# Patient Record
Sex: MALE | Race: WHITE | HISPANIC OR LATINO | Employment: UNEMPLOYED | URBAN - METROPOLITAN AREA
[De-identification: names, ages, dates, MRNs, and addresses within clinical notes are randomized per-mention and may not be internally consistent; named-entity substitution may affect disease eponyms.]

---

## 2018-09-01 ENCOUNTER — APPOINTMENT (OUTPATIENT)
Dept: RADIOLOGY | Facility: MEDICAL CENTER | Age: 58
End: 2018-09-01
Attending: EMERGENCY MEDICINE
Payer: COMMERCIAL

## 2018-09-01 ENCOUNTER — HOSPITAL ENCOUNTER (OUTPATIENT)
Facility: MEDICAL CENTER | Age: 58
End: 2018-09-06
Attending: EMERGENCY MEDICINE | Admitting: INTERNAL MEDICINE
Payer: COMMERCIAL

## 2018-09-01 DIAGNOSIS — R07.9 CHEST PAIN, UNSPECIFIED TYPE: ICD-10-CM

## 2018-09-01 LAB
ALBUMIN SERPL BCP-MCNC: 4.2 G/DL (ref 3.2–4.9)
ALBUMIN/GLOB SERPL: 1.4 G/DL
ALP SERPL-CCNC: 70 U/L (ref 30–99)
ALT SERPL-CCNC: 18 U/L (ref 2–50)
ANION GAP SERPL CALC-SCNC: 8 MMOL/L (ref 0–11.9)
AST SERPL-CCNC: 18 U/L (ref 12–45)
BASOPHILS # BLD AUTO: 0.9 % (ref 0–1.8)
BASOPHILS # BLD: 0.06 K/UL (ref 0–0.12)
BILIRUB SERPL-MCNC: 0.5 MG/DL (ref 0.1–1.5)
BUN SERPL-MCNC: 23 MG/DL (ref 8–22)
CALCIUM SERPL-MCNC: 8.8 MG/DL (ref 8.5–10.5)
CHLORIDE SERPL-SCNC: 107 MMOL/L (ref 96–112)
CO2 SERPL-SCNC: 24 MMOL/L (ref 20–33)
CREAT SERPL-MCNC: 1.03 MG/DL (ref 0.5–1.4)
EKG IMPRESSION: NORMAL
EOSINOPHIL # BLD AUTO: 0.24 K/UL (ref 0–0.51)
EOSINOPHIL NFR BLD: 3.7 % (ref 0–6.9)
ERYTHROCYTE [DISTWIDTH] IN BLOOD BY AUTOMATED COUNT: 39.8 FL (ref 35.9–50)
GLOBULIN SER CALC-MCNC: 2.9 G/DL (ref 1.9–3.5)
GLUCOSE BLD-MCNC: 64 MG/DL (ref 65–99)
GLUCOSE SERPL-MCNC: 188 MG/DL (ref 65–99)
HCT VFR BLD AUTO: 40.6 % (ref 42–52)
HGB BLD-MCNC: 14.3 G/DL (ref 14–18)
IMM GRANULOCYTES # BLD AUTO: 0.08 K/UL (ref 0–0.11)
IMM GRANULOCYTES NFR BLD AUTO: 1.2 % (ref 0–0.9)
LYMPHOCYTES # BLD AUTO: 1.29 K/UL (ref 1–4.8)
LYMPHOCYTES NFR BLD: 19.7 % (ref 22–41)
MCH RBC QN AUTO: 31.6 PG (ref 27–33)
MCHC RBC AUTO-ENTMCNC: 35.2 G/DL (ref 33.7–35.3)
MCV RBC AUTO: 89.8 FL (ref 81.4–97.8)
MONOCYTES # BLD AUTO: 0.78 K/UL (ref 0–0.85)
MONOCYTES NFR BLD AUTO: 11.9 % (ref 0–13.4)
NEUTROPHILS # BLD AUTO: 4.1 K/UL (ref 1.82–7.42)
NEUTROPHILS NFR BLD: 62.6 % (ref 44–72)
NRBC # BLD AUTO: 0 K/UL
NRBC BLD-RTO: 0 /100 WBC
PLATELET # BLD AUTO: 152 K/UL (ref 164–446)
PMV BLD AUTO: 10.3 FL (ref 9–12.9)
POTASSIUM SERPL-SCNC: 3.6 MMOL/L (ref 3.6–5.5)
PROT SERPL-MCNC: 7.1 G/DL (ref 6–8.2)
RBC # BLD AUTO: 4.52 M/UL (ref 4.7–6.1)
SODIUM SERPL-SCNC: 139 MMOL/L (ref 135–145)
TROPONIN I SERPL-MCNC: <0.01 NG/ML (ref 0–0.04)
WBC # BLD AUTO: 6.6 K/UL (ref 4.8–10.8)

## 2018-09-01 PROCEDURE — 93005 ELECTROCARDIOGRAM TRACING: CPT

## 2018-09-01 PROCEDURE — 93005 ELECTROCARDIOGRAM TRACING: CPT | Performed by: EMERGENCY MEDICINE

## 2018-09-01 PROCEDURE — 84484 ASSAY OF TROPONIN QUANT: CPT

## 2018-09-01 PROCEDURE — 85025 COMPLETE CBC W/AUTO DIFF WBC: CPT

## 2018-09-01 PROCEDURE — 96374 THER/PROPH/DIAG INJ IV PUSH: CPT

## 2018-09-01 PROCEDURE — 80053 COMPREHEN METABOLIC PANEL: CPT

## 2018-09-01 PROCEDURE — 99285 EMERGENCY DEPT VISIT HI MDM: CPT

## 2018-09-01 PROCEDURE — 36415 COLL VENOUS BLD VENIPUNCTURE: CPT

## 2018-09-01 PROCEDURE — 71045 X-RAY EXAM CHEST 1 VIEW: CPT

## 2018-09-01 PROCEDURE — 82962 GLUCOSE BLOOD TEST: CPT

## 2018-09-01 PROCEDURE — 700101 HCHG RX REV CODE 250

## 2018-09-01 RX ORDER — DEXTROSE MONOHYDRATE 25 G/50ML
INJECTION, SOLUTION INTRAVENOUS
Status: COMPLETED
Start: 2018-09-01 | End: 2018-09-01

## 2018-09-01 RX ORDER — DEXTROSE MONOHYDRATE 25 G/50ML
50 INJECTION, SOLUTION INTRAVENOUS ONCE
Status: COMPLETED | OUTPATIENT
Start: 2018-09-01 | End: 2018-09-03

## 2018-09-01 RX ADMIN — DEXTROSE MONOHYDRATE 50 ML: 25 INJECTION, SOLUTION INTRAVENOUS at 22:07

## 2018-09-01 ASSESSMENT — PAIN SCALES - GENERAL: PAINLEVEL_OUTOF10: 8

## 2018-09-02 ENCOUNTER — APPOINTMENT (OUTPATIENT)
Dept: RADIOLOGY | Facility: MEDICAL CENTER | Age: 58
End: 2018-09-02
Attending: INTERNAL MEDICINE
Payer: COMMERCIAL

## 2018-09-02 PROBLEM — J43.8 OTHER EMPHYSEMA (HCC): Status: ACTIVE | Noted: 2018-09-02

## 2018-09-02 PROBLEM — I10 ESSENTIAL HYPERTENSION: Status: ACTIVE | Noted: 2018-09-02

## 2018-09-02 PROBLEM — I50.22 CHRONIC SYSTOLIC CONGESTIVE HEART FAILURE (HCC): Status: ACTIVE | Noted: 2018-09-02

## 2018-09-02 PROBLEM — R07.9 CHEST PAIN: Status: ACTIVE | Noted: 2018-09-02

## 2018-09-02 PROBLEM — E11.9 TYPE 2 DIABETES MELLITUS, WITH LONG-TERM CURRENT USE OF INSULIN (HCC): Status: ACTIVE | Noted: 2018-09-02

## 2018-09-02 PROBLEM — Z79.4 TYPE 2 DIABETES MELLITUS, WITH LONG-TERM CURRENT USE OF INSULIN (HCC): Status: ACTIVE | Noted: 2018-09-02

## 2018-09-02 PROBLEM — I25.10 CAD (CORONARY ARTERY DISEASE): Status: ACTIVE | Noted: 2018-09-02

## 2018-09-02 LAB
CHOLEST SERPL-MCNC: 109 MG/DL (ref 100–199)
EKG IMPRESSION: NORMAL
EST. AVERAGE GLUCOSE BLD GHB EST-MCNC: 243 MG/DL
GLUCOSE BLD-MCNC: 160 MG/DL (ref 65–99)
GLUCOSE BLD-MCNC: 209 MG/DL (ref 65–99)
GLUCOSE BLD-MCNC: 392 MG/DL (ref 65–99)
HBA1C MFR BLD: 10.1 % (ref 0–5.6)
HDLC SERPL-MCNC: 30 MG/DL
LDLC SERPL CALC-MCNC: 63 MG/DL
TRIGL SERPL-MCNC: 80 MG/DL (ref 0–149)
TROPONIN I SERPL-MCNC: <0.01 NG/ML (ref 0–0.04)
TSH SERPL DL<=0.005 MIU/L-ACNC: 2.49 UIU/ML (ref 0.38–5.33)

## 2018-09-02 PROCEDURE — G0378 HOSPITAL OBSERVATION PER HR: HCPCS

## 2018-09-02 PROCEDURE — 93306 TTE W/DOPPLER COMPLETE: CPT

## 2018-09-02 PROCEDURE — 96372 THER/PROPH/DIAG INJ SC/IM: CPT

## 2018-09-02 PROCEDURE — 82962 GLUCOSE BLOOD TEST: CPT | Mod: 91

## 2018-09-02 PROCEDURE — A9270 NON-COVERED ITEM OR SERVICE: HCPCS | Performed by: NURSE PRACTITIONER

## 2018-09-02 PROCEDURE — 93308 TTE F-UP OR LMTD: CPT | Mod: 26 | Performed by: INTERNAL MEDICINE

## 2018-09-02 PROCEDURE — 80061 LIPID PANEL: CPT

## 2018-09-02 PROCEDURE — A9270 NON-COVERED ITEM OR SERVICE: HCPCS | Performed by: EMERGENCY MEDICINE

## 2018-09-02 PROCEDURE — 99220 PR INITIAL OBSERVATION CARE,LEVL III: CPT | Performed by: INTERNAL MEDICINE

## 2018-09-02 PROCEDURE — 700111 HCHG RX REV CODE 636 W/ 250 OVERRIDE (IP)

## 2018-09-02 PROCEDURE — 700102 HCHG RX REV CODE 250 W/ 637 OVERRIDE(OP): Performed by: EMERGENCY MEDICINE

## 2018-09-02 PROCEDURE — A9502 TC99M TETROFOSMIN: HCPCS

## 2018-09-02 PROCEDURE — 84443 ASSAY THYROID STIM HORMONE: CPT

## 2018-09-02 PROCEDURE — 83036 HEMOGLOBIN GLYCOSYLATED A1C: CPT

## 2018-09-02 PROCEDURE — 700117 HCHG RX CONTRAST REV CODE 255: Performed by: EMERGENCY MEDICINE

## 2018-09-02 PROCEDURE — 84484 ASSAY OF TROPONIN QUANT: CPT

## 2018-09-02 PROCEDURE — 71275 CT ANGIOGRAPHY CHEST: CPT

## 2018-09-02 PROCEDURE — A9270 NON-COVERED ITEM OR SERVICE: HCPCS | Performed by: INTERNAL MEDICINE

## 2018-09-02 PROCEDURE — 93010 ELECTROCARDIOGRAM REPORT: CPT | Performed by: INTERNAL MEDICINE

## 2018-09-02 PROCEDURE — 700102 HCHG RX REV CODE 250 W/ 637 OVERRIDE(OP): Performed by: INTERNAL MEDICINE

## 2018-09-02 PROCEDURE — 93005 ELECTROCARDIOGRAM TRACING: CPT | Performed by: INTERNAL MEDICINE

## 2018-09-02 PROCEDURE — 700102 HCHG RX REV CODE 250 W/ 637 OVERRIDE(OP): Performed by: NURSE PRACTITIONER

## 2018-09-02 RX ORDER — HYDROCHLOROTHIAZIDE 25 MG/1
25 TABLET ORAL DAILY
COMMUNITY

## 2018-09-02 RX ORDER — ONDANSETRON 4 MG/1
4 TABLET, ORALLY DISINTEGRATING ORAL EVERY 4 HOURS PRN
Status: DISCONTINUED | OUTPATIENT
Start: 2018-09-02 | End: 2018-09-06 | Stop reason: HOSPADM

## 2018-09-02 RX ORDER — METOPROLOL SUCCINATE 50 MG/1
50 TABLET, EXTENDED RELEASE ORAL
Status: DISCONTINUED | OUTPATIENT
Start: 2018-09-02 | End: 2018-09-06 | Stop reason: HOSPADM

## 2018-09-02 RX ORDER — INSULIN GLARGINE 100 [IU]/ML
42 INJECTION, SOLUTION SUBCUTANEOUS EVERY MORNING
Status: ON HOLD | COMMUNITY
End: 2018-09-06

## 2018-09-02 RX ORDER — METOPROLOL SUCCINATE 50 MG/1
75 TABLET, EXTENDED RELEASE ORAL DAILY
COMMUNITY

## 2018-09-02 RX ORDER — CLOPIDOGREL BISULFATE 75 MG/1
75 TABLET ORAL DAILY
Status: DISCONTINUED | OUTPATIENT
Start: 2018-09-02 | End: 2018-09-06 | Stop reason: HOSPADM

## 2018-09-02 RX ORDER — REGADENOSON 0.08 MG/ML
INJECTION, SOLUTION INTRAVENOUS
Status: COMPLETED
Start: 2018-09-02 | End: 2018-09-02

## 2018-09-02 RX ORDER — POLYETHYLENE GLYCOL 3350 17 G/17G
1 POWDER, FOR SOLUTION ORAL
Status: DISCONTINUED | OUTPATIENT
Start: 2018-09-02 | End: 2018-09-06 | Stop reason: HOSPADM

## 2018-09-02 RX ORDER — PREGABALIN 75 MG/1
75 CAPSULE ORAL 2 TIMES DAILY
COMMUNITY

## 2018-09-02 RX ORDER — ONDANSETRON 2 MG/ML
4 INJECTION INTRAMUSCULAR; INTRAVENOUS EVERY 4 HOURS PRN
Status: DISCONTINUED | OUTPATIENT
Start: 2018-09-02 | End: 2018-09-06 | Stop reason: HOSPADM

## 2018-09-02 RX ORDER — DEXTROSE MONOHYDRATE 25 G/50ML
25 INJECTION, SOLUTION INTRAVENOUS
Status: DISCONTINUED | OUTPATIENT
Start: 2018-09-02 | End: 2018-09-04

## 2018-09-02 RX ORDER — RANITIDINE 150 MG/1
150 TABLET ORAL 2 TIMES DAILY
COMMUNITY

## 2018-09-02 RX ORDER — PREGABALIN 100 MG/1
100 CAPSULE ORAL
COMMUNITY

## 2018-09-02 RX ORDER — ENALAPRIL MALEATE 2.5 MG/1
5 TABLET ORAL
Status: DISCONTINUED | OUTPATIENT
Start: 2018-09-02 | End: 2018-09-06 | Stop reason: HOSPADM

## 2018-09-02 RX ORDER — RANOLAZINE 500 MG/1
1000 TABLET, EXTENDED RELEASE ORAL 2 TIMES DAILY
Status: DISCONTINUED | OUTPATIENT
Start: 2018-09-02 | End: 2018-09-06 | Stop reason: HOSPADM

## 2018-09-02 RX ORDER — ATORVASTATIN CALCIUM 40 MG/1
40 TABLET, FILM COATED ORAL EVERY EVENING
Status: DISCONTINUED | OUTPATIENT
Start: 2018-09-02 | End: 2018-09-06 | Stop reason: HOSPADM

## 2018-09-02 RX ORDER — ALBUTEROL SULFATE 90 UG/1
2 AEROSOL, METERED RESPIRATORY (INHALATION)
Status: DISCONTINUED | OUTPATIENT
Start: 2018-09-02 | End: 2018-09-06 | Stop reason: HOSPADM

## 2018-09-02 RX ORDER — ENALAPRIL MALEATE 5 MG/1
5 TABLET ORAL DAILY
COMMUNITY

## 2018-09-02 RX ORDER — RANOLAZINE 500 MG/1
1000 TABLET, EXTENDED RELEASE ORAL 2 TIMES DAILY
COMMUNITY

## 2018-09-02 RX ORDER — BUDESONIDE AND FORMOTEROL FUMARATE DIHYDRATE 160; 4.5 UG/1; UG/1
2 AEROSOL RESPIRATORY (INHALATION) 2 TIMES DAILY
Status: DISCONTINUED | OUTPATIENT
Start: 2018-09-02 | End: 2018-09-06 | Stop reason: HOSPADM

## 2018-09-02 RX ORDER — OMEPRAZOLE 20 MG/1
20 CAPSULE, DELAYED RELEASE ORAL DAILY
Status: DISCONTINUED | OUTPATIENT
Start: 2018-09-02 | End: 2018-09-06 | Stop reason: HOSPADM

## 2018-09-02 RX ORDER — BISACODYL 10 MG
10 SUPPOSITORY, RECTAL RECTAL
Status: DISCONTINUED | OUTPATIENT
Start: 2018-09-02 | End: 2018-09-06 | Stop reason: HOSPADM

## 2018-09-02 RX ORDER — ACETAMINOPHEN 325 MG/1
650 TABLET ORAL EVERY 6 HOURS PRN
Status: DISCONTINUED | OUTPATIENT
Start: 2018-09-02 | End: 2018-09-06 | Stop reason: HOSPADM

## 2018-09-02 RX ORDER — CLOPIDOGREL BISULFATE 75 MG/1
75 TABLET ORAL DAILY
Status: ON HOLD | COMMUNITY
End: 2018-09-06

## 2018-09-02 RX ORDER — AMOXICILLIN 250 MG
2 CAPSULE ORAL 2 TIMES DAILY
Status: DISCONTINUED | OUTPATIENT
Start: 2018-09-02 | End: 2018-09-06 | Stop reason: HOSPADM

## 2018-09-02 RX ORDER — INSULIN GLARGINE 100 [IU]/ML
42 INJECTION, SOLUTION SUBCUTANEOUS NIGHTLY
Status: ON HOLD | COMMUNITY
End: 2018-09-06

## 2018-09-02 RX ADMIN — CLOPIDOGREL 75 MG: 75 TABLET, FILM COATED ORAL at 06:50

## 2018-09-02 RX ADMIN — METOPROLOL SUCCINATE 50 MG: 50 TABLET, EXTENDED RELEASE ORAL at 06:51

## 2018-09-02 RX ADMIN — BUDESONIDE AND FORMOTEROL FUMARATE DIHYDRATE 2 PUFF: 160; 4.5 AEROSOL RESPIRATORY (INHALATION) at 17:18

## 2018-09-02 RX ADMIN — INSULIN HUMAN 2 UNITS: 100 INJECTION, SOLUTION PARENTERAL at 17:20

## 2018-09-02 RX ADMIN — NITROGLYCERIN 1 INCH: 20 OINTMENT TOPICAL at 00:25

## 2018-09-02 RX ADMIN — REGADENOSON 0.4 MG: 0.08 INJECTION, SOLUTION INTRAVENOUS at 12:00

## 2018-09-02 RX ADMIN — INSULIN HUMAN 5 UNITS: 100 INJECTION, SOLUTION PARENTERAL at 13:23

## 2018-09-02 RX ADMIN — BUDESONIDE AND FORMOTEROL FUMARATE DIHYDRATE 2 PUFF: 160; 4.5 AEROSOL RESPIRATORY (INHALATION) at 13:22

## 2018-09-02 RX ADMIN — ATORVASTATIN CALCIUM 40 MG: 40 TABLET, FILM COATED ORAL at 17:19

## 2018-09-02 RX ADMIN — ACETAMINOPHEN 650 MG: 325 TABLET, FILM COATED ORAL at 04:43

## 2018-09-02 RX ADMIN — RANOLAZINE 1000 MG: 500 TABLET, FILM COATED, EXTENDED RELEASE ORAL at 07:40

## 2018-09-02 RX ADMIN — INSULIN HUMAN 4 UNITS: 100 INJECTION, SOLUTION PARENTERAL at 21:08

## 2018-09-02 RX ADMIN — RANOLAZINE 1000 MG: 500 TABLET, FILM COATED, EXTENDED RELEASE ORAL at 17:19

## 2018-09-02 RX ADMIN — OMEPRAZOLE 20 MG: 20 CAPSULE, DELAYED RELEASE ORAL at 06:50

## 2018-09-02 RX ADMIN — IOHEXOL 100 ML: 350 INJECTION, SOLUTION INTRAVENOUS at 01:37

## 2018-09-02 RX ADMIN — ENALAPRIL MALEATE 5 MG: 5 TABLET ORAL at 06:51

## 2018-09-02 ASSESSMENT — ENCOUNTER SYMPTOMS
DIZZINESS: 0
BRUISES/BLEEDS EASILY: 0
SORE THROAT: 0
FLANK PAIN: 0
BACK PAIN: 0
NECK PAIN: 0
PALPITATIONS: 0
BLOOD IN STOOL: 0
DIAPHORESIS: 0
NAUSEA: 1
DIARRHEA: 0
FEVER: 0
SHORTNESS OF BREATH: 1
SEIZURES: 0
HEADACHES: 0
BLURRED VISION: 0
ABDOMINAL PAIN: 0
CHILLS: 0
VOMITING: 0
SPUTUM PRODUCTION: 0
WHEEZING: 0
MYALGIAS: 0
FOCAL WEAKNESS: 0
COUGH: 0

## 2018-09-02 ASSESSMENT — COPD QUESTIONNAIRES
DURING THE PAST 4 WEEKS HOW MUCH DID YOU FEEL SHORT OF BREATH: NONE/LITTLE OF THE TIME
DO YOU EVER COUGH UP ANY MUCUS OR PHLEGM?: YES, A FEW DAYS A WEEK OR MONTH
HAVE YOU SMOKED AT LEAST 100 CIGARETTES IN YOUR ENTIRE LIFE: YES
COPD SCREENING SCORE: 6

## 2018-09-02 ASSESSMENT — COGNITIVE AND FUNCTIONAL STATUS - GENERAL
MOBILITY SCORE: 24
SUGGESTED CMS G CODE MODIFIER DAILY ACTIVITY: CH
SUGGESTED CMS G CODE MODIFIER MOBILITY: CH
DAILY ACTIVITIY SCORE: 24

## 2018-09-02 ASSESSMENT — LIFESTYLE VARIABLES
ALCOHOL_USE: NO
EVER_SMOKED: YES
ALCOHOL_USE: NO

## 2018-09-02 ASSESSMENT — PATIENT HEALTH QUESTIONNAIRE - PHQ9
SUM OF ALL RESPONSES TO PHQ9 QUESTIONS 1 AND 2: 0
2. FEELING DOWN, DEPRESSED, IRRITABLE, OR HOPELESS: NOT AT ALL
1. LITTLE INTEREST OR PLEASURE IN DOING THINGS: NOT AT ALL
SUM OF ALL RESPONSES TO PHQ9 QUESTIONS 1 AND 2: 0
2. FEELING DOWN, DEPRESSED, IRRITABLE, OR HOPELESS: NOT AT ALL
1. LITTLE INTEREST OR PLEASURE IN DOING THINGS: NOT AT ALL

## 2018-09-02 ASSESSMENT — PAIN SCALES - GENERAL
PAINLEVEL_OUTOF10: 0
PAINLEVEL_OUTOF10: 2
PAINLEVEL_OUTOF10: 7

## 2018-09-02 NOTE — CONSULTS
Cardiology consultation  Reason for consultation: Abnormal stress test   asking for consultation: Dr. Lanier    HPI:    Patient is a good historian.  He is currently a prisoner.  He had open heart surgery in the distant past and most recently stenting because of chest pain in 2012.  He came into the emergency room after complaints of chest pain yesterday.    Workup including stress test showed abnormalities and he was transferred from the clinical decision unit to telemetry to see if he needed an angiogram.  The patient still endorses chest pain.  He tells me about his complex medical history.  He also recounts upon questioning that he had an angiogram less than 1 year ago in [place].  There are no valve arteries amenable to stenting and he was told medical management was key.    He is currently pain-free, says his pain can last longer be brief it is often better when he does not worry about things.  Thinks it might get worse with exertion but not actually sure.  Does not think he gets better with nitroglycerin.  He is not asking for narcotics        Past Medical History:  has a past medical history of Asthma; Congestive heart failure ([facility]); Diabetes ([facility]); Esophageal stricture; GERD (gastroesophageal reflux disease); Hypertension; and MI (myocardial infarction) ([facility]).  Past Surgical History:  has a past surgical history that includes stent placement; cardiac cath, right/left heart; and pacemaker insertion.  Past Social History:  reports that he has quit smoking. He has never used smokeless tobacco. He reports that he does not drink alcohol or use drugs.  Past Family History: History reviewed. No pertinent family history.  Allergies: Peanut (diagnostic); Carvacrol; Codeine; Isordil [isosorbide]; and Ketorolac    Current Medications:  Prior to Admission medications    Medication Sig Start Date End Date Taking? Authorizing Provider   hydroCHLOROthiazide (HYDRODIURIL) 25 MG Tab Take 25 mg by mouth every day.   Yes  "Physician Outpatient   raNITidine (ZANTAC) 150 MG Tab Take 150 mg by mouth 2 times a day.   Yes Physician Outpatient   enalapril (VASOTEC) 5 MG Tab Take 5 mg by mouth every day.   Yes Physician Outpatient   insulin glargine (LANTUS) 100 UNIT/ML Solution Inject 42 Units as instructed every evening.   Yes Physician Outpatient   insulin glargine (LANTUS) 100 UNIT/ML Solution Inject 42 Units as instructed every morning.   Yes Physician Outpatient   pregabalin (LYRICA) 75 MG Cap Take 75 mg by mouth 2 times a day.   Yes Physician Outpatient   pregabalin (LYRICA) 100 MG Cap Take 100 mg by mouth every bedtime.   Yes Physician Outpatient   metoprolol SR (TOPROL XL) 50 MG TABLET SR 24 HR Take 50 mg by mouth every day.   Yes Physician Outpatient   insulin regular (HUMULIN R) 100 Unit/mL Solution Inject  as instructed 3 times a day before meals.   Yes Physician Outpatient   ranolazine (RANEXA) 500 MG TABLET SR 12 HR Take 1,000 mg by mouth 2 times a day.   Yes Physician Outpatient   clopidogrel (PLAVIX) 75 MG Tab Take 75 mg by mouth every day.   Yes Physician Outpatient       Review of Systems:  Review of Systems   Constitutional: Negative for malaise/fatigue and weight loss.   Eyes: Negative.    Respiratory: Negative for cough, shortness of breath and wheezing.    Cardiovascular: Negative for palpitations, leg swelling and PND.   Gastrointestinal: Negative for abdominal pain, heartburn and nausea.   Musculoskeletal: Negative for joint pain and myalgias.   Neurological: Negative for dizziness and loss of consciousness.   Endo/Heme/Allergies: Does not bruise/bleed easily.   Psychiatric/Behavioral: Negative for depression. The patient is not nervous/anxious and does not have insomnia.    All other systems reviewed and are negative.    Blood pressure 110/61, pulse 60, temperature 36.7 °C (98 °F), resp. rate 16, height 1.854 m (6' 1\"), weight 86.6 kg (190 lb 14.7 oz), SpO2 93 %.    Physical Examination:  Physical Exam "   Constitutional: He is oriented to person, place, and time. He appears well-nourished.   Poor dentition but otherwise healthy looking multiple tattoos   HENT:   Head: Normocephalic and atraumatic.   Eyes: Pupils are equal, round, and reactive to light. EOM are normal. No scleral icterus.   Neck: No JVD present. No thyromegaly present.   Cardiovascular: Normal rate, regular rhythm and intact distal pulses.    No murmur heard.  Pulmonary/Chest: Breath sounds normal. He exhibits no tenderness.   Abdominal: Soft. Bowel sounds are normal. There is no tenderness.   Musculoskeletal: He exhibits no edema or tenderness.   Lymphadenopathy:     He has no cervical adenopathy.   Neurological: He is alert and oriented to person, place, and time. He exhibits normal muscle tone. Coordination normal.   Skin: Skin is warm and dry.   Psychiatric: He has a normal mood and affect. His behavior is normal.     Data:  I reviewed the images of the stress test myself.  He has 2 areas that look like small infarcts.  There is no significant tanna-infarct ischemia, and my opinion is that it adds up to less than even small.  Overall his ejection fraction is about 45% and calculated to be 44%    Troponins are negative ×3  Defibrillator is interrogated.  He said he has not put in because his ejection fraction several years ago was less than 35%.  No inappropriate function noted, no recent defibrillation or arrhythmia detected    CMP is normal with a GFR more than 60.  LDL is 63 hemoglobin A1c is 10.1    Impression:  Noncardiac chest pain  Nuclear perfusion imaging study which is reassuring near normal ejection fraction in a patient with known ischemic heart myopathy.  Infarct without significant ischemia portends good prognostic outlook at least from a coronary standpoint    Plan:    Chest pain  No indication for more intensive workup at this point.  I agree with his assessment that there is likely no culprit vessel causing his chest pain, in fact  I am not quite convinced his pain is actually coming from his heart.  His troponins being negative are very reassuring  Palliation and comfort    Coronary disease  Continue dual antiplatelet therapy  Statin  Blood pressure is excellent, continue his low-dose ACE inhibitor with his diabetes.  Diabetes control will be paramount    No further cardiac recommendations at this point, agree with omeprazole  Please do not hesitate to call if you have concerns while he is in the hospital

## 2018-09-02 NOTE — PROGRESS NOTES
Seen pt, AOx 4. On cardiac monitor with Apaced (100%), HR 60. Chest pain on sternal area and back at 8/10. Plan of care discussed includes Safety, labs, cardiac monitoring, stress test and pt understands.

## 2018-09-02 NOTE — ASSESSMENT & PLAN NOTE
Appears compensated at this time-no clinical changes noted today  Continue metoprolol and enalapril  Fluid and salt restriction

## 2018-09-02 NOTE — ASSESSMENT & PLAN NOTE
Patient's stay in the hospital was extended again today primarily due to significant persistent hyperglycemia despite increasing intensity of insulin.  Blood sugars are getting better, now in the 200-300 range.  Given patient will return to [facility] with a more liberal diet and less scrutiny regarding his blood sugars, I would like to continue to intensify while inpatient to try to keep his blood sugars as well controlled as possible once he leaves the hospital.    -Increase Humalog to 10 units scheduled q. before meals, with moderate intensity supplemental scale on top  -Continue home Lantus increased to 44 units twice daily  -hemoglobin A1c-10.1, indicating poor control  -Hypoglycemic protocol in place

## 2018-09-02 NOTE — ED TRIAGE NOTES
[facility]  Chief Complaint   Patient presents with   • Chest Pressure     mid sternal pressure that radiates to (L) jaw and arm,  8/10 at this time,  started this am after working out.       Pt on monitor,  VSS, EKG done.  No acute distress noted at this time.  ERP at bedside.    intermediate guards at bedside.

## 2018-09-02 NOTE — PROGRESS NOTES
Patient admitted at 4:30 AM this morning. Patient had a complete workup. The patient's imaging studies at this point to show an acute infarction. Patient also has a reduced left ventricular ejection fraction. The patient says he passed out in the toilet and then fell down and hit his AICD and it's ballooned up in his chest. The patient will need his AICD evaluated. Patient at this point will be transferred upstairs cardiology has been consulted because of the positive stress test and ischemia.

## 2018-09-02 NOTE — ED NOTES
Pt resting quietly,  Updated on POC/ CT scan,  Wet swab given for comfort.  guards remain at bedside.

## 2018-09-02 NOTE — ED PROVIDER NOTES
"CHIEF COMPLAINT  Chief Complaint   Patient presents with   • Chest Pressure     mid sternal pressure that radiates to (L) jaw and arm,  8/10 at this time,  started this am after working out.         HPI  Carmita-Cinthya Rosales is a 58 y.o. male who presents in transfer from an outside facility for chest pain. Patient is a complex cardiac patient with a history of 2 bypass surgeries, pacemaker implantation, and reportedly 9 stents. He notes his last cardiac catheterization with angiography was done in 2017 in [facility] and it showed only \"one vessel that is 50% open\". Patient notes the pain started this morning when he was doing pushups and is substernal radiating to his back. He says it does not feel like his previous heart attacks however he noted that he was told to go directly to the emergency room if he expressed any chest pain because he \"only had one vessel left.\"    REVIEW OF SYSTEMS  Constitutional: No fevers, weakness, weight loss   Skin: No rashes, abrasions, lacerations, or pruritus  HEENT: No diplopia or blurred vision, no eye pain, no discharge. No ear pain, ringing in ears, or decreased hearing. No sore throat, runny nose, sores, trouble swallowing, trouble speaking.  Neck: No neck pain, stiffness, or masses.  Chest: Diffuse anterior chest pain  Pulm: No shortness of breath, cough, wheezing, stridor, or pain with inspiration/expiration  Gastrointestinal: No nausea, vomiting, diarrhea, constipation, bloating, melena, hematochezia or pain.  Genitourinary: No pain, urgency, frequency, dysuria, hematuria, or polyuria.   Musculoskeletal: No recent trauma, pain, swelling, weakness  Neurologic: No sensory or motor changes. No confusion or disorientation.  Heme: No bleeding or bruising problems.   Immuno: No hx of recurrent infections      PAST MEDICAL HISTORY   has a past medical history of Asthma; Congestive heart failure (HCC); Diabetes (HCC); Esophageal stricture; GERD (gastroesophageal reflux " "disease); Hypertension; and MI (myocardial infarction) (HCC).    SOCIAL HISTORY  Social History     Social History Main Topics   • Smoking status: Former Smoker   • Smokeless tobacco: Never Used   • Alcohol use No   • Drug use: No   • Sexual activity: Not on file       SURGICAL HISTORY   has a past surgical history that includes stent placement; cardiac cath, right/left heart; and pacemaker insertion.    CURRENT MEDICATIONS  Home Medications     Reviewed by Geoffrey Nuñez R.N. (Registered Nurse) on 09/02/18 at 0734  Med List Status: Not Addressed   Medication Last Dose Status   clopidogrel (PLAVIX) 75 MG Tab  Active   enalapril (VASOTEC) 5 MG Tab  Active   hydroCHLOROthiazide (HYDRODIURIL) 25 MG Tab  Active   insulin glargine (LANTUS) 100 UNIT/ML Solution  Active   insulin glargine (LANTUS) 100 UNIT/ML Solution  Active   insulin regular (HUMULIN R) 100 Unit/mL Solution  Active   metoprolol SR (TOPROL XL) 50 MG TABLET SR 24 HR  Active   pregabalin (LYRICA) 100 MG Cap  Active   pregabalin (LYRICA) 75 MG Cap  Active   raNITidine (ZANTAC) 150 MG Tab  Active   ranolazine (RANEXA) 500 MG TABLET SR 12 HR  Active                ALLERGIES  Allergies   Allergen Reactions   • Peanut (Diagnostic) Anaphylaxis   • Carvacrol Shortness of Breath   • Codeine Rash         • Isordil [Isosorbide] Shortness of Breath   • Ketorolac Unspecified      \"doesn't remember\"       PHYSICAL EXAM  VITAL SIGNS: /61   Pulse 60   Temp 36.7 °C (98 °F)   Resp 16   Ht 1.854 m (6' 1\")   Wt 86.6 kg (190 lb 14.7 oz) Comment: pt had handcuffs  SpO2 93%   BMI 25.19 kg/m²    Gen: Alert in no apparent distress.  HEENT: No signs of trauma, Bilateral external ears normal, Nose normal. Conjunctiva normal, Non-icteric.   Neck:  No tenderness, Supple, No masses  Lymphatic: No cervical lymphadenopathy noted.   Cardiovascular: Regular rate and rhythm, no murmurs.   Thorax & Lungs: Normal breath sounds, No respiratory distress, No wheezing bilateral " chest rise  Abdomen: Bowel sounds normal, Soft, No tenderness, No masses, No pulsatile masses. No Guarding or rebound  Skin: Warm, Dry, No erythema, No rash.   Back: No bony tenderness, No CVA tenderness.   Extremities: Intact distal pulses, No edema, No tenderness, range of motion grossly normal all ext. No tenderness to palpation or major deformities noted.   Neurologic: Alert , no facial droop, grossly normal coordination and strength  Psychiatric: Affect normal, Judgment normal, Mood normal.       INITIAL IMPRESSION  Patient arrives in transfer from an outside facility for possible acute coronary syndrome. Patient appears nontoxic and nondistressed on initial evaluation and had reassuring initial labs at the outside facility. Given his extremely complex history, the patient will be admitted to the hospitalist service for further treatment and evaluation. I will obtain another set of enzymes and likely will need to perform an angiographic analysis of his pulmonary arteries due to the possibility of PE being the culprit.    LABS  Results for orders placed or performed during the hospital encounter of 09/01/18   CBC WITH DIFFERENTIAL   Result Value Ref Range    WBC 6.6 4.8 - 10.8 K/uL    RBC 4.52 (L) 4.70 - 6.10 M/uL    Hemoglobin 14.3 14.0 - 18.0 g/dL    Hematocrit 40.6 (L) 42.0 - 52.0 %    MCV 89.8 81.4 - 97.8 fL    MCH 31.6 27.0 - 33.0 pg    MCHC 35.2 33.7 - 35.3 g/dL    RDW 39.8 35.9 - 50.0 fL    Platelet Count 152 (L) 164 - 446 K/uL    MPV 10.3 9.0 - 12.9 fL    Neutrophils-Polys 62.60 44.00 - 72.00 %    Lymphocytes 19.70 (L) 22.00 - 41.00 %    Monocytes 11.90 0.00 - 13.40 %    Eosinophils 3.70 0.00 - 6.90 %    Basophils 0.90 0.00 - 1.80 %    Immature Granulocytes 1.20 (H) 0.00 - 0.90 %    Nucleated RBC 0.00 /100 WBC    Neutrophils (Absolute) 4.10 1.82 - 7.42 K/uL    Lymphs (Absolute) 1.29 1.00 - 4.80 K/uL    Monos (Absolute) 0.78 0.00 - 0.85 K/uL    Eos (Absolute) 0.24 0.00 - 0.51 K/uL    Baso (Absolute) 0.06  0.00 - 0.12 K/uL    Immature Granulocytes (abs) 0.08 0.00 - 0.11 K/uL    NRBC (Absolute) 0.00 K/uL   COMP METABOLIC PANEL   Result Value Ref Range    Sodium 139 135 - 145 mmol/L    Potassium 3.6 3.6 - 5.5 mmol/L    Chloride 107 96 - 112 mmol/L    Co2 24 20 - 33 mmol/L    Anion Gap 8.0 0.0 - 11.9    Glucose 188 (H) 65 - 99 mg/dL    Bun 23 (H) 8 - 22 mg/dL    Creatinine 1.03 0.50 - 1.40 mg/dL    Calcium 8.8 8.5 - 10.5 mg/dL    AST(SGOT) 18 12 - 45 U/L    ALT(SGPT) 18 2 - 50 U/L    Alkaline Phosphatase 70 30 - 99 U/L    Total Bilirubin 0.5 0.1 - 1.5 mg/dL    Albumin 4.2 3.2 - 4.9 g/dL    Total Protein 7.1 6.0 - 8.2 g/dL    Globulin 2.9 1.9 - 3.5 g/dL    A-G Ratio 1.4 g/dL   TROPONIN   Result Value Ref Range    Troponin I <0.01 0.00 - 0.04 ng/mL   ESTIMATED GFR   Result Value Ref Range    GFR If African American >60 >60 mL/min/1.73 m 2    GFR If Non African American >60 >60 mL/min/1.73 m 2   Troponin in four (4) hours   Result Value Ref Range    Troponin I <0.01 0.00 - 0.04 ng/mL   TSH (Thyroid Stimulating Hormone)   Result Value Ref Range    TSH 2.490 0.380 - 5.330 uIU/mL   Lipid Profile (Lipid Panel) Fasting   Result Value Ref Range    Cholesterol,Tot 109 100 - 199 mg/dL    Triglycerides 80 0 - 149 mg/dL    HDL 30 (A) >=40 mg/dL    LDL 63 <100 mg/dL   Hemoglobin A1c   Result Value Ref Range    Glycohemoglobin 10.1 (H) 0.0 - 5.6 %    Est Avg Glucose 243 mg/dL   ACCU-CHEK GLUCOSE   Result Value Ref Range    Glucose - Accu-Ck 64 (L) 65 - 99 mg/dL   ACCU-CHEK GLUCOSE   Result Value Ref Range    Glucose - Accu-Ck 160 (H) 65 - 99 mg/dL   ACCU-CHEK GLUCOSE   Result Value Ref Range    Glucose - Accu-Ck 392 (H) 65 - 99 mg/dL   EKG (NOW)   Result Value Ref Range    Report       [facility] Emergency Dept[facility]    Test Date:  2018-09-01  Pt Name:    [facility]         Department: ER  MRN:        [id number]                      Room:       RD 03  Gender:     Male                         Technician:  [id number]  :        1960                   Requested By:ER TRIAGE PROTOCOL  Order #:    [id number]                    Reading MD:    Measurements  Intervals                                Axis  Rate:       60                           P:  TX:         152                          QRS:        8  QRSD:       138                          T:          168  QT:         428  QTc:        428    Interpretive Statements  ATRIAL-PACED RHYTHM  NONSPECIFIC INTRAVENTRICULAR CONDUCTION DELAY  INFERIOR INFARCT, AGE INDETERMINATE  PROBABLE LATERAL INFARCT, AGE INDETERMINATE  No previous ECG available for comparison     EKG in four (4) hours   Result Value Ref Range    Report       [facility]    [facility] Date:  2018  [facility]         [facility]  MRN:        [id number]                      Room:       T216  Gender:     Male                         Technician: LOUISA  :        1960                   Requested By:SHANNEN PANDYA  Order #:    [id number]                    Reading MD: Caleb Light MD    Measurements  Intervals                                Axis  Rate:       60                           P:  TX:         156                          QRS:        23  QRSD:       136                          T:          218  QT:         460  QTc:        460    Interpretive Statements  ATRIAL-PACED RHYTHM  NONSPECIFIC INTRAVENTRICULAR CONDUCTION DELAY  INFERIOR LATERAL INFARCTION, AGE INDETERMINATE    Electronically Signed On 2018 7:44:53 PDT by Caleb Light MD         RADIOLOGY  NM-CARDIAC STRESS TEST   Final Result      CT-CTA CHEST PULMONARY ARTERY W/ RECONS   Final Result      No central or segmental pulmonary embolus is identified.      Dependent airspace opacities likely represent atelectasis. Slightly more confluent patchy opacity in the superior segment of the left lower lobe may be infectious/inflammatory. Follow-up is recommended.      Cardiomegaly.      Healing fractures of the  anterior left third, fourth and fifth ribs.      Underlying emphysematous changes.      Small hiatal hernia.      1.2 cm nodule in the left upper quadrant may represent a splenule or an enlarged lymph node.      Duodenal diverticulum is likely present.                  DX-CHEST-PORTABLE (1 VIEW)   Final Result      No acute cardiopulmonary process is seen.      Mild cardiomegaly.         ECHOCARDIOGRAM COMP W/O CONT    (Results Pending)       Reevaluation   Time: 11 PM  Vital signs: Stable per nursing note  Assessment: No change    Reevaluation   Time: 1:30 AM, September 2  Vital signs: Reviewed per nursing note, appears stable   Assessment: No change    Reevaluation   Time: 3 AM, September 2  Vital signs: Reviewed per nursing note, appears stable  Assessment: No change        COURSE & MEDICAL DECISION MAKING  Pertinent Labs & Imaging studies reviewed. (See chart for details)  Patient arrived to the emergency department in transfer from an outside facility for cardiology consultation due to his complex medical history and ongoing chest pain. He had no findings to suggest pulmonary embolism or acute cardiac necrosis and remained hemodynamically grossly neurologically stable while in the emergency department. He will be admitted to the hospitalist service for further treatment and evaluation and likely will receive provocative testing this morning.    FINAL IMPRESSION  1. Chest pain, unspecified type        Electronically signed by: Joel Davies, 9/1/2018 9:58 PM

## 2018-09-02 NOTE — ASSESSMENT & PLAN NOTE
-no clear e/o ACS  Initial EKG and troponin negative  -Nuc med cardiac perfusion testing reviewed with cardiology, no evidence of active reversible ischemia.  -continue outpatient ranexa, add nitro PRN, will consider imdur if need for further antianginal  Nitro and morphine when necessary for chest pain    9/5: Patient denies any further chest discomfort today, overall reports he feels well.

## 2018-09-02 NOTE — H&P
"Hospital Medicine History & Physical Note    Date of Service  9/2/2018    Primary Care Physician  No primary care provider on file.    Consultants  None    Code Status  Full code    Chief Complaint  Chest pain    History of Presenting Illness  58 y.o. male with a past medical history of coronary artery disease status post CABG and multiple stent placement, congestive heart failure with systolic dysfunction, emphysema, insulin-dependent diabetes mellitus, esophageal stricture, GERD, hypertension, chronic back pain who presented 9/1/2018 with chest pain that started yesterday night.  Patient reports left-sided crushing chest pain rated at 8/10 intensity with radiation to the left arm, jaw and back with associated shortness of breath and nausea.  He denies any relieving or exacerbating factors.  Patient received full dose of aspirin prior to arrival.  He denies any fevers, cough, headache, lightheadedness, diaphoresis or abdominal pain.  He states he underwent an angiography in 2017 that showed \"only one vessel was left and was 50% occluded\".    Initial troponin less than 0.01  EKG interpreted by me reveals atrial paced rhythm with Q waves inferior and lateral leads.  No ST elevation noted  Chest x-ray revealed cardiomegaly with no pulmonary edema or focal pulmonary consolidations    Review of Systems  Review of Systems   Constitutional: Negative for chills, diaphoresis and fever.   HENT: Negative for hearing loss and sore throat.    Eyes: Negative for blurred vision.   Respiratory: Positive for shortness of breath. Negative for cough, sputum production and wheezing.    Cardiovascular: Positive for chest pain. Negative for palpitations and leg swelling.   Gastrointestinal: Positive for nausea. Negative for abdominal pain, blood in stool, diarrhea and vomiting.   Genitourinary: Negative for dysuria, flank pain and urgency.   Musculoskeletal: Negative for back pain, joint pain, myalgias and neck pain.   Skin: Negative " "for rash.   Neurological: Negative for dizziness, focal weakness, seizures and headaches.   Endo/Heme/Allergies: Does not bruise/bleed easily.   Psychiatric/Behavioral: Negative for suicidal ideas.   All other systems reviewed and are negative.      Past Medical History   has a past medical history of Asthma; Congestive heart failure (HCC); Diabetes (HCC); Esophageal stricture; GERD (gastroesophageal reflux disease); Hypertension; and MI (myocardial infarction) ([facility]).    Surgical History   has a past surgical history that includes stent placement; cardiac cath, right/left heart; and pacemaker insertion.     Family History  History reviewed.  No pertinent family history    Social History   reports that he has quit smoking. He has never used smokeless tobacco. He reports that he does not drink alcohol or use drugs.    Allergies  Allergies   Allergen Reactions   • Peanut (Diagnostic) Anaphylaxis   • Carvacrol Shortness of Breath   • Codeine Rash         • Isordil [Isosorbide] Shortness of Breath   • Ketorolac Unspecified      \"doesn't remember\"       Medications  Toprol 50 mg daily  Enalapril 5 mg daily  Plavix 75 mg daily  Atorvastatin 40 mg daily  Ranexa thousand milligrams twice daily  Aspirin 81 mg daily  Prevacid 20 mg daily  Dulera  Albuterol  Insulin sliding scale       Physical Exam  Blood Pressure: 155/75   Temperature: 37 °C (98.6 °F)   Pulse: (!) 59   Respiration: 13   Pulse Oximetry: 94 %     Physical Exam   Constitutional: He is oriented to person, place, and time. He appears well-developed and well-nourished. No distress.   HENT:   Head: Normocephalic and atraumatic.   Mouth/Throat: Oropharynx is clear and moist.   Eyes: Pupils are equal, round, and reactive to light. Conjunctivae are normal. No scleral icterus.   Neck: Normal range of motion. Neck supple.   Cardiovascular: Normal rate, regular rhythm and normal heart sounds.    Pulmonary/Chest: Effort normal and breath sounds normal. No respiratory " distress. He has no wheezes. He has no rales.   Abdominal: Soft. Bowel sounds are normal. He exhibits no distension. There is no tenderness. There is no rebound.   Musculoskeletal: Normal range of motion. He exhibits no edema or tenderness.   Lymphadenopathy:     He has no cervical adenopathy.   Neurological: He is alert and oriented to person, place, and time. No cranial nerve deficit. Coordination normal.   Skin: Skin is warm. No rash noted.   Psychiatric: He has a normal mood and affect. His behavior is normal.   Nursing note and vitals reviewed.      Laboratory:  Recent Labs      09/01/18   2230   WBC  6.6   RBC  4.52*   HEMOGLOBIN  14.3   HEMATOCRIT  40.6*   MCV  89.8   MCH  31.6   MCHC  35.2   RDW  39.8   PLATELETCT  152*   MPV  10.3     Recent Labs      09/01/18 2230   SODIUM  139   POTASSIUM  3.6   CHLORIDE  107   CO2  24   GLUCOSE  188*   BUN  23*   CREATININE  1.03   CALCIUM  8.8     Recent Labs      09/01/18   2230   ALTSGPT  18   ASTSGOT  18   ALKPHOSPHAT  70   TBILIRUBIN  0.5   GLUCOSE  188*                 Lab Results   Component Value Date    TROPONINI <0.01 09/01/2018       Urinalysis:    No results found     Imaging:  CT-CTA CHEST PULMONARY ARTERY W/ RECONS   Final Result      No central or segmental pulmonary embolus is identified.      Dependent airspace opacities likely represent atelectasis. Slightly more confluent patchy opacity in the superior segment of the left lower lobe may be infectious/inflammatory. Follow-up is recommended.      Cardiomegaly.      Healing fractures of the anterior left third, fourth and fifth ribs.      Underlying emphysematous changes.      Small hiatal hernia.      1.2 cm nodule in the left upper quadrant may represent a splenule or an enlarged lymph node.      Duodenal diverticulum is likely present.                  DX-CHEST-PORTABLE (1 VIEW)   Final Result      No acute cardiopulmonary process is seen.      Mild cardiomegaly.         NM-CARDIAC STRESS TEST     (Results Pending)         Assessment/Plan:  I anticipate this patient is appropriate for observation status at this time.    Chest pain- (present on admission)   Assessment & Plan    Rule out ACS  Initial EKG and troponin negative  Continuous cardiac monitoring with serial EKG and troponin  Nuclear medicine cardiac stress test in the morning if troponin remains negative  Patient has been given full dose of aspirin  Check lipid panel, TSH and hemoglobin A1c  Nitro and morphine when necessary for chest pain          Type 2 diabetes mellitus, with long-term current use of insulin (HCC)   Assessment & Plan    Start on insulin sliding scale with serial Accu-Checks  Check hemoglobin A1c  Hypoglycemic protocol in place            Other emphysema (HCC)- (present on admission)   Assessment & Plan    Continue Dulera and albuterol as needed        Essential hypertension- (present on admission)   Assessment & Plan    Continue metoprolol and enalapril        Chronic systolic congestive heart failure (HCC)- (present on admission)   Assessment & Plan    Appears compensated at this time  Continue metoprolol and enalapril  Fluid and salt restriction        CAD (coronary artery disease)- (present on admission)   Assessment & Plan    Status post CABG and multiple stent placement  Continue aspirin, Plavix and atorvastatin            VTE prophylaxis: SCD

## 2018-09-03 LAB
ANION GAP SERPL CALC-SCNC: 6 MMOL/L (ref 0–11.9)
BASOPHILS # BLD AUTO: 0.6 % (ref 0–1.8)
BASOPHILS # BLD: 0.04 K/UL (ref 0–0.12)
BUN SERPL-MCNC: 20 MG/DL (ref 8–22)
CALCIUM SERPL-MCNC: 9.2 MG/DL (ref 8.5–10.5)
CHLORIDE SERPL-SCNC: 103 MMOL/L (ref 96–112)
CO2 SERPL-SCNC: 21 MMOL/L (ref 20–33)
CREAT SERPL-MCNC: 0.92 MG/DL (ref 0.5–1.4)
EOSINOPHIL # BLD AUTO: 0.18 K/UL (ref 0–0.51)
EOSINOPHIL NFR BLD: 2.9 % (ref 0–6.9)
ERYTHROCYTE [DISTWIDTH] IN BLOOD BY AUTOMATED COUNT: 39.7 FL (ref 35.9–50)
GLUCOSE BLD-MCNC: 262 MG/DL (ref 65–99)
GLUCOSE BLD-MCNC: 320 MG/DL (ref 65–99)
GLUCOSE BLD-MCNC: 395 MG/DL (ref 65–99)
GLUCOSE BLD-MCNC: 445 MG/DL (ref 65–99)
GLUCOSE BLD-MCNC: 454 MG/DL (ref 65–99)
GLUCOSE SERPL-MCNC: 340 MG/DL (ref 65–99)
HCT VFR BLD AUTO: 41.4 % (ref 42–52)
HGB BLD-MCNC: 14 G/DL (ref 14–18)
IMM GRANULOCYTES # BLD AUTO: 0.04 K/UL (ref 0–0.11)
IMM GRANULOCYTES NFR BLD AUTO: 0.6 % (ref 0–0.9)
LV EJECT FRACT  99904: 40
LV EJECT FRACT MOD 2C 99903: 47.46
LV EJECT FRACT MOD 4C 99902: 42.61
LV EJECT FRACT MOD BP 99901: 44.4
LYMPHOCYTES # BLD AUTO: 0.85 K/UL (ref 1–4.8)
LYMPHOCYTES NFR BLD: 13.5 % (ref 22–41)
MAGNESIUM SERPL-MCNC: 1.9 MG/DL (ref 1.5–2.5)
MCH RBC QN AUTO: 30.6 PG (ref 27–33)
MCHC RBC AUTO-ENTMCNC: 33.8 G/DL (ref 33.7–35.3)
MCV RBC AUTO: 90.6 FL (ref 81.4–97.8)
MONOCYTES # BLD AUTO: 0.66 K/UL (ref 0–0.85)
MONOCYTES NFR BLD AUTO: 10.5 % (ref 0–13.4)
NEUTROPHILS # BLD AUTO: 4.52 K/UL (ref 1.82–7.42)
NEUTROPHILS NFR BLD: 71.9 % (ref 44–72)
NRBC # BLD AUTO: 0 K/UL
NRBC BLD-RTO: 0 /100 WBC
PLATELET # BLD AUTO: 142 K/UL (ref 164–446)
PMV BLD AUTO: 10.3 FL (ref 9–12.9)
POTASSIUM SERPL-SCNC: 4.1 MMOL/L (ref 3.6–5.5)
RBC # BLD AUTO: 4.57 M/UL (ref 4.7–6.1)
SODIUM SERPL-SCNC: 130 MMOL/L (ref 135–145)
WBC # BLD AUTO: 6.3 K/UL (ref 4.8–10.8)

## 2018-09-03 PROCEDURE — G0378 HOSPITAL OBSERVATION PER HR: HCPCS

## 2018-09-03 PROCEDURE — 700102 HCHG RX REV CODE 250 W/ 637 OVERRIDE(OP): Performed by: INTERNAL MEDICINE

## 2018-09-03 PROCEDURE — 36415 COLL VENOUS BLD VENIPUNCTURE: CPT

## 2018-09-03 PROCEDURE — 85025 COMPLETE CBC W/AUTO DIFF WBC: CPT

## 2018-09-03 PROCEDURE — 82962 GLUCOSE BLOOD TEST: CPT | Mod: 91

## 2018-09-03 PROCEDURE — 99225 PR SUBSEQUENT OBSERVATION CARE,LEVEL II: CPT | Performed by: INTERNAL MEDICINE

## 2018-09-03 PROCEDURE — A9270 NON-COVERED ITEM OR SERVICE: HCPCS | Performed by: SPECIALIST

## 2018-09-03 PROCEDURE — 80048 BASIC METABOLIC PNL TOTAL CA: CPT

## 2018-09-03 PROCEDURE — A9270 NON-COVERED ITEM OR SERVICE: HCPCS | Performed by: INTERNAL MEDICINE

## 2018-09-03 PROCEDURE — 96372 THER/PROPH/DIAG INJ SC/IM: CPT

## 2018-09-03 PROCEDURE — 700102 HCHG RX REV CODE 250 W/ 637 OVERRIDE(OP): Performed by: SPECIALIST

## 2018-09-03 PROCEDURE — 83735 ASSAY OF MAGNESIUM: CPT

## 2018-09-03 RX ORDER — NITROGLYCERIN 0.4 MG/1
0.4 TABLET SUBLINGUAL
Status: DISCONTINUED | OUTPATIENT
Start: 2018-09-03 | End: 2018-09-06 | Stop reason: HOSPADM

## 2018-09-03 RX ORDER — INSULIN GLARGINE 100 [IU]/ML
42 INJECTION, SOLUTION SUBCUTANEOUS EVERY EVENING
Status: DISCONTINUED | OUTPATIENT
Start: 2018-09-03 | End: 2018-09-05

## 2018-09-03 RX ORDER — INSULIN GLARGINE 100 [IU]/ML
42 INJECTION, SOLUTION SUBCUTANEOUS EVERY MORNING
Status: DISCONTINUED | OUTPATIENT
Start: 2018-09-04 | End: 2018-09-05

## 2018-09-03 RX ORDER — PREGABALIN 75 MG/1
75 CAPSULE ORAL 2 TIMES DAILY
Status: DISCONTINUED | OUTPATIENT
Start: 2018-09-04 | End: 2018-09-06 | Stop reason: HOSPADM

## 2018-09-03 RX ORDER — INSULIN GLARGINE 100 [IU]/ML
10 INJECTION, SOLUTION SUBCUTANEOUS
Status: DISCONTINUED | OUTPATIENT
Start: 2018-09-03 | End: 2018-09-04

## 2018-09-03 RX ORDER — PREGABALIN 100 MG/1
100 CAPSULE ORAL NIGHTLY
Status: DISCONTINUED | OUTPATIENT
Start: 2018-09-03 | End: 2018-09-06 | Stop reason: HOSPADM

## 2018-09-03 RX ADMIN — CLOPIDOGREL 75 MG: 75 TABLET, FILM COATED ORAL at 06:39

## 2018-09-03 RX ADMIN — INSULIN HUMAN 6 UNITS: 100 INJECTION, SOLUTION PARENTERAL at 18:15

## 2018-09-03 RX ADMIN — PREGABALIN 100 MG: 100 CAPSULE ORAL at 23:08

## 2018-09-03 RX ADMIN — ASPIRIN 81 MG: 81 TABLET, DELAYED RELEASE ORAL at 06:39

## 2018-09-03 RX ADMIN — ENALAPRIL MALEATE 5 MG: 5 TABLET ORAL at 06:39

## 2018-09-03 RX ADMIN — ATORVASTATIN CALCIUM 40 MG: 40 TABLET, FILM COATED ORAL at 18:12

## 2018-09-03 RX ADMIN — INSULIN HUMAN 6 UNITS: 100 INJECTION, SOLUTION PARENTERAL at 20:28

## 2018-09-03 RX ADMIN — BUDESONIDE AND FORMOTEROL FUMARATE DIHYDRATE 2 PUFF: 160; 4.5 AEROSOL RESPIRATORY (INHALATION) at 18:12

## 2018-09-03 RX ADMIN — RANOLAZINE 1000 MG: 500 TABLET, FILM COATED, EXTENDED RELEASE ORAL at 18:12

## 2018-09-03 RX ADMIN — OMEPRAZOLE 20 MG: 20 CAPSULE, DELAYED RELEASE ORAL at 06:39

## 2018-09-03 RX ADMIN — BUDESONIDE AND FORMOTEROL FUMARATE DIHYDRATE 2 PUFF: 160; 4.5 AEROSOL RESPIRATORY (INHALATION) at 06:40

## 2018-09-03 RX ADMIN — NITROGLYCERIN 0.4 MG: 0.4 TABLET SUBLINGUAL at 11:27

## 2018-09-03 RX ADMIN — RANOLAZINE 1000 MG: 500 TABLET, FILM COATED, EXTENDED RELEASE ORAL at 06:39

## 2018-09-03 RX ADMIN — INSULIN HUMAN 5 UNITS: 100 INJECTION, SOLUTION PARENTERAL at 11:31

## 2018-09-03 RX ADMIN — INSULIN GLARGINE 10 UNITS: 100 INJECTION, SOLUTION SUBCUTANEOUS at 11:31

## 2018-09-03 RX ADMIN — METOPROLOL SUCCINATE 50 MG: 50 TABLET, EXTENDED RELEASE ORAL at 06:39

## 2018-09-03 RX ADMIN — INSULIN HUMAN 3 UNITS: 100 INJECTION, SOLUTION PARENTERAL at 06:32

## 2018-09-03 ASSESSMENT — PAIN SCALES - GENERAL
PAINLEVEL_OUTOF10: 4
PAINLEVEL_OUTOF10: 0
PAINLEVEL_OUTOF10: 4

## 2018-09-03 ASSESSMENT — ENCOUNTER SYMPTOMS
PALPITATIONS: 0
FOCAL WEAKNESS: 0
SHORTNESS OF BREATH: 0
ABDOMINAL PAIN: 0
FEVER: 0
DIZZINESS: 0
VOMITING: 0
NAUSEA: 0
MYALGIAS: 0
CHILLS: 0
LOSS OF CONSCIOUSNESS: 0
BRUISES/BLEEDS EASILY: 0
NECK PAIN: 0
DEPRESSION: 0
BLURRED VISION: 0

## 2018-09-03 NOTE — PROGRESS NOTES
[facility] Medicine Daily Progress Note    Date of Service  9/3/2018    Chief Complaint  58 y.o. male admitted 9/1/2018 with chest pain    Hospital Course  See below    Interval Problem Update  CP-mild today, about 4/10 localized over the L pectoral region, no associated nausea, TELE showed 100% paced with no ectopy.    Consultants/Specialty  CARDS    Disposition  TELE    Review of Systems  Review of Systems   Constitutional: Negative for chills and fever.   HENT: Negative for hearing loss.    Eyes: Negative for blurred vision.   Respiratory: Negative for shortness of breath.    Cardiovascular: Positive for chest pain. Negative for palpitations and leg swelling.   Gastrointestinal: Negative for abdominal pain, nausea and vomiting.   Genitourinary: Negative for dysuria and urgency.   Musculoskeletal: Negative for myalgias and neck pain.   Neurological: Negative for dizziness, focal weakness and loss of consciousness.   Endo/Heme/Allergies: Does not bruise/bleed easily.   Psychiatric/Behavioral: Negative for depression.   All other systems reviewed and are negative.       Physical Exam  Blood Pressure: 106/67   Temperature: 36.8 °C (98.2 °F)   Pulse: 60   Respiration: 14   Pulse Oximetry: 93 %     Physical Exam   Constitutional: He is oriented to person, place, and time. He appears well-developed and well-nourished. No distress.   HENT:   Head: Normocephalic and atraumatic.   Mouth/Throat: No oropharyngeal exudate.   Eyes: Pupils are equal, round, and reactive to light. Right eye exhibits no discharge. Left eye exhibits no discharge.   Neck: Normal range of motion. Neck supple.   Cardiovascular: Normal rate, regular rhythm, normal heart sounds and intact distal pulses.    No murmur heard.  Pulmonary/Chest: Effort normal and breath sounds normal. No stridor. No respiratory distress. He has no wheezes. He has no rales.   Abdominal: Soft. Bowel sounds are normal. There is no tenderness.   Musculoskeletal: Normal range  of motion. He exhibits no edema or tenderness.   Neurological: He is alert and oriented to person, place, and time. No cranial nerve deficit.   Skin: Skin is warm and dry. No rash noted.   Psychiatric: He has a normal mood and affect.   Nursing note and vitals reviewed.      Fluids    Intake/Output Summary (Last 24 hours) at 09/03/18 1642  Last data filed at 09/03/18 1400   Gross per 24 hour   Intake              600 ml   Output             2650 ml   Net            -2050 ml       Laboratory  Recent Labs      09/01/18 2230 09/03/18   0056   WBC  6.6  6.3   RBC  4.52*  4.57*   HEMOGLOBIN  14.3  14.0   HEMATOCRIT  40.6*  41.4*   MCV  89.8  90.6   MCH  31.6  30.6   MCHC  35.2  33.8   RDW  39.8  39.7   PLATELETCT  152*  142*   MPV  10.3  10.3     Recent Labs      09/01/18 2230 09/03/18   0056   SODIUM  139  130*   POTASSIUM  3.6  4.1   CHLORIDE  107  103   CO2  24  21   GLUCOSE  188*  340*   BUN  23*  20   CREATININE  1.03  0.92   CALCIUM  8.8  9.2             Recent Labs      09/02/18   0450   TRIGLYCERIDE  80   HDL  30*   LDL  63       Imaging  ECHOCARDIOGRAM COMP W/O CONT   Final Result      NM-CARDIAC STRESS TEST   Final Result      CT-CTA CHEST PULMONARY ARTERY W/ RECONS   Final Result      No central or segmental pulmonary embolus is identified.      Dependent airspace opacities likely represent atelectasis. Slightly more confluent patchy opacity in the superior segment of the left lower lobe may be infectious/inflammatory. Follow-up is recommended.      Cardiomegaly.      Healing fractures of the anterior left third, fourth and fifth ribs.      Underlying emphysematous changes.      Small hiatal hernia.      1.2 cm nodule in the left upper quadrant may represent a splenule or an enlarged lymph node.      Duodenal diverticulum is likely present.                  DX-CHEST-PORTABLE (1 VIEW)   Final Result      No acute cardiopulmonary process is seen.      Mild cardiomegaly.              Assessment/Plan  Chest  pain- (present on admission)   Assessment & Plan    -no clear e/o ACS  Initial EKG and troponin negative  Continuous cardiac monitoring with serial EKG and troponin  -NM stress test positive, but cards consulted and recommended pain control at [facility]  -continue outpatient ranexa, add nitro PRN, consider imdur  -need to control sugars better for his DM  Nitro and morphine when necessary for chest pain          Type 2 diabetes mellitus, with long-term current use of insulin (HCC)- (present on admission)   Assessment & Plan    -continue on insulin sliding scale with serial Accu-Checks, adjust PRN  Check hemoglobin A1c-10.1, indicating poor control  Hypoglycemic protocol in place            Other emphysema (HCC)- (present on admission)   Assessment & Plan    Continue Dulera and albuterol as needed        Essential hypertension- (present on admission)   Assessment & Plan    Continue metoprolol and enalapril        Chronic systolic congestive heart failure (HCC)- (present on admission)   Assessment & Plan    Appears compensated at this time-no clinical changes noted today  Continue metoprolol and enalapril  Fluid and salt restriction        CAD (coronary artery disease)- (present on admission)   Assessment & Plan    Status post CABG and multiple stent placement  Continue aspirin, Plavix and atorvastatin

## 2018-09-03 NOTE — PROGRESS NOTES
"Report received. Pt care assumed. Assessment performed. Pt AOx4. Pt laying supine in bed. Pt c/o 5/10 cp \"same as before\" and no signs of distress. Refusing tylenol at this time. MD notified by vaishali RN. Guards at bedside. Bed in low, locked position. Bed alarm on. Call light within reach. Treaded socks on pt.  Hourly rounding in place.  "

## 2018-09-03 NOTE — PROGRESS NOTES
Pt complaining of 5/10 chest pain. Pt states same as admission pain. Pt refuses tylenol. Updated Dr. Thapa, no new orders.

## 2018-09-03 NOTE — PROGRESS NOTES
Assumed care of pt. He is alert and oriented and denies pain. Discussed POC, pt AICD evaluated earlier today. Discussed safety. Pt is currently hand cuffed to the bed. Educated about not attempting to get out of bed without calling. Non slip socks on. Call light and personal belongings are within reach.    2 guards at bedside.

## 2018-09-04 LAB
GLUCOSE BLD-MCNC: 286 MG/DL (ref 65–99)
GLUCOSE BLD-MCNC: 333 MG/DL (ref 65–99)
GLUCOSE BLD-MCNC: 362 MG/DL (ref 65–99)
GLUCOSE BLD-MCNC: 369 MG/DL (ref 65–99)

## 2018-09-04 PROCEDURE — A9270 NON-COVERED ITEM OR SERVICE: HCPCS | Performed by: SPECIALIST

## 2018-09-04 PROCEDURE — 700102 HCHG RX REV CODE 250 W/ 637 OVERRIDE(OP): Performed by: SPECIALIST

## 2018-09-04 PROCEDURE — 96372 THER/PROPH/DIAG INJ SC/IM: CPT

## 2018-09-04 PROCEDURE — A9270 NON-COVERED ITEM OR SERVICE: HCPCS | Performed by: INTERNAL MEDICINE

## 2018-09-04 PROCEDURE — G0378 HOSPITAL OBSERVATION PER HR: HCPCS

## 2018-09-04 PROCEDURE — 82962 GLUCOSE BLOOD TEST: CPT | Mod: 91

## 2018-09-04 PROCEDURE — 700102 HCHG RX REV CODE 250 W/ 637 OVERRIDE(OP): Performed by: INTERNAL MEDICINE

## 2018-09-04 PROCEDURE — 700102 HCHG RX REV CODE 250 W/ 637 OVERRIDE(OP): Performed by: HOSPITALIST

## 2018-09-04 PROCEDURE — 99225 PR SUBSEQUENT OBSERVATION CARE,LEVEL II: CPT | Performed by: HOSPITALIST

## 2018-09-04 RX ORDER — DEXTROSE MONOHYDRATE 25 G/50ML
25 INJECTION, SOLUTION INTRAVENOUS
Status: DISCONTINUED | OUTPATIENT
Start: 2018-09-04 | End: 2018-09-06 | Stop reason: HOSPADM

## 2018-09-04 RX ADMIN — ALBUTEROL SULFATE 2 PUFF: 90 AEROSOL, METERED RESPIRATORY (INHALATION) at 00:00

## 2018-09-04 RX ADMIN — INSULIN HUMAN 3 UNITS: 100 INJECTION, SOLUTION PARENTERAL at 06:50

## 2018-09-04 RX ADMIN — OMEPRAZOLE 20 MG: 20 CAPSULE, DELAYED RELEASE ORAL at 06:48

## 2018-09-04 RX ADMIN — INSULIN GLARGINE 42 UNITS: 100 INJECTION, SOLUTION SUBCUTANEOUS at 17:37

## 2018-09-04 RX ADMIN — CLOPIDOGREL 75 MG: 75 TABLET, FILM COATED ORAL at 06:47

## 2018-09-04 RX ADMIN — INSULIN GLARGINE 42 UNITS: 100 INJECTION, SOLUTION SUBCUTANEOUS at 06:50

## 2018-09-04 RX ADMIN — INSULIN LISPRO 6 UNITS: 100 INJECTION, SOLUTION INTRAVENOUS; SUBCUTANEOUS at 17:36

## 2018-09-04 RX ADMIN — PREGABALIN 75 MG: 75 CAPSULE ORAL at 12:43

## 2018-09-04 RX ADMIN — PREGABALIN 75 MG: 75 CAPSULE ORAL at 06:47

## 2018-09-04 RX ADMIN — INSULIN GLARGINE 42 UNITS: 100 INJECTION, SOLUTION SUBCUTANEOUS at 00:01

## 2018-09-04 RX ADMIN — PREGABALIN 100 MG: 100 CAPSULE ORAL at 21:07

## 2018-09-04 RX ADMIN — ALBUTEROL SULFATE 2 PUFF: 90 AEROSOL, METERED RESPIRATORY (INHALATION) at 10:24

## 2018-09-04 RX ADMIN — RANOLAZINE 1000 MG: 500 TABLET, FILM COATED, EXTENDED RELEASE ORAL at 06:47

## 2018-09-04 RX ADMIN — ATORVASTATIN CALCIUM 40 MG: 40 TABLET, FILM COATED ORAL at 17:34

## 2018-09-04 RX ADMIN — METOPROLOL SUCCINATE 50 MG: 50 TABLET, EXTENDED RELEASE ORAL at 06:48

## 2018-09-04 RX ADMIN — BUDESONIDE AND FORMOTEROL FUMARATE DIHYDRATE 2 PUFF: 160; 4.5 AEROSOL RESPIRATORY (INHALATION) at 06:49

## 2018-09-04 RX ADMIN — INSULIN HUMAN 5 UNITS: 100 INJECTION, SOLUTION PARENTERAL at 12:39

## 2018-09-04 RX ADMIN — INSULIN LISPRO 8 UNITS: 100 INJECTION, SOLUTION INTRAVENOUS; SUBCUTANEOUS at 21:06

## 2018-09-04 RX ADMIN — RANOLAZINE 1000 MG: 500 TABLET, FILM COATED, EXTENDED RELEASE ORAL at 17:34

## 2018-09-04 RX ADMIN — ENALAPRIL MALEATE 5 MG: 5 TABLET ORAL at 06:48

## 2018-09-04 RX ADMIN — BUDESONIDE AND FORMOTEROL FUMARATE DIHYDRATE 2 PUFF: 160; 4.5 AEROSOL RESPIRATORY (INHALATION) at 17:34

## 2018-09-04 RX ADMIN — ASPIRIN 81 MG: 81 TABLET, DELAYED RELEASE ORAL at 06:47

## 2018-09-04 ASSESSMENT — PATIENT HEALTH QUESTIONNAIRE - PHQ9
1. LITTLE INTEREST OR PLEASURE IN DOING THINGS: NOT AT ALL
2. FEELING DOWN, DEPRESSED, IRRITABLE, OR HOPELESS: NOT AT ALL
SUM OF ALL RESPONSES TO PHQ9 QUESTIONS 1 AND 2: 0

## 2018-09-04 ASSESSMENT — ENCOUNTER SYMPTOMS
DEPRESSION: 0
BRUISES/BLEEDS EASILY: 0
NECK PAIN: 0
ABDOMINAL PAIN: 0
DIZZINESS: 0
SHORTNESS OF BREATH: 0
FEVER: 0
MYALGIAS: 0
FOCAL WEAKNESS: 0
CHILLS: 0
PALPITATIONS: 0
VOMITING: 0
BLURRED VISION: 0
LOSS OF CONSCIOUSNESS: 0
NAUSEA: 0

## 2018-09-04 ASSESSMENT — PAIN SCALES - GENERAL
PAINLEVEL_OUTOF10: 0

## 2018-09-04 NOTE — PROGRESS NOTES
Dr. Payan rounded with Pt. MD stated the Pt could receive shower orders and that when the Pt had another BM he'd like the nurse to observe the stool for signs of infection, it the stool shows signs of infection he'd like a it tested.

## 2018-09-04 NOTE — PROGRESS NOTES
Assumed care of pt. He is alert and oriented/denies pain. Dicussed POC at bedside. Discussed safety with pt, he verbalizes understanding. Bed is locked in lowest position/call light and personal belongings are within reach.

## 2018-09-04 NOTE — RESPIRATORY CARE
COPD EDUCATION by COPD CLINICAL EDUCATOR  9/4/2018 at 7:38 AM by Sariah Ferguson     Patient reviewed by COPD education team. Patient does not qualify for COPD program.

## 2018-09-05 LAB
GLUCOSE BLD-MCNC: 212 MG/DL (ref 65–99)
GLUCOSE BLD-MCNC: 251 MG/DL (ref 65–99)
GLUCOSE BLD-MCNC: 259 MG/DL (ref 65–99)
GLUCOSE BLD-MCNC: 269 MG/DL (ref 65–99)

## 2018-09-05 PROCEDURE — A9270 NON-COVERED ITEM OR SERVICE: HCPCS | Performed by: SPECIALIST

## 2018-09-05 PROCEDURE — G0378 HOSPITAL OBSERVATION PER HR: HCPCS

## 2018-09-05 PROCEDURE — 96372 THER/PROPH/DIAG INJ SC/IM: CPT

## 2018-09-05 PROCEDURE — 700102 HCHG RX REV CODE 250 W/ 637 OVERRIDE(OP): Performed by: HOSPITALIST

## 2018-09-05 PROCEDURE — 700102 HCHG RX REV CODE 250 W/ 637 OVERRIDE(OP): Performed by: SPECIALIST

## 2018-09-05 PROCEDURE — 99226 PR SUBSEQUENT OBSERVATION CARE,LEVEL III: CPT | Performed by: HOSPITALIST

## 2018-09-05 PROCEDURE — 82962 GLUCOSE BLOOD TEST: CPT | Mod: 91

## 2018-09-05 PROCEDURE — 700102 HCHG RX REV CODE 250 W/ 637 OVERRIDE(OP): Performed by: INTERNAL MEDICINE

## 2018-09-05 PROCEDURE — A9270 NON-COVERED ITEM OR SERVICE: HCPCS | Performed by: INTERNAL MEDICINE

## 2018-09-05 RX ORDER — INSULIN GLARGINE 100 [IU]/ML
44 INJECTION, SOLUTION SUBCUTANEOUS EVERY EVENING
Status: DISCONTINUED | OUTPATIENT
Start: 2018-09-05 | End: 2018-09-06 | Stop reason: HOSPADM

## 2018-09-05 RX ORDER — INSULIN GLARGINE 100 [IU]/ML
44 INJECTION, SOLUTION SUBCUTANEOUS EVERY MORNING
Status: DISCONTINUED | OUTPATIENT
Start: 2018-09-06 | End: 2018-09-06 | Stop reason: HOSPADM

## 2018-09-05 RX ADMIN — INSULIN LISPRO 5 UNITS: 100 INJECTION, SOLUTION INTRAVENOUS; SUBCUTANEOUS at 17:26

## 2018-09-05 RX ADMIN — CLOPIDOGREL 75 MG: 75 TABLET, FILM COATED ORAL at 06:48

## 2018-09-05 RX ADMIN — RANOLAZINE 1000 MG: 500 TABLET, FILM COATED, EXTENDED RELEASE ORAL at 06:49

## 2018-09-05 RX ADMIN — BUDESONIDE AND FORMOTEROL FUMARATE DIHYDRATE 2 PUFF: 160; 4.5 AEROSOL RESPIRATORY (INHALATION) at 06:49

## 2018-09-05 RX ADMIN — INSULIN LISPRO 5 UNITS: 100 INJECTION, SOLUTION INTRAVENOUS; SUBCUTANEOUS at 11:59

## 2018-09-05 RX ADMIN — ATORVASTATIN CALCIUM 40 MG: 40 TABLET, FILM COATED ORAL at 17:20

## 2018-09-05 RX ADMIN — INSULIN GLARGINE 42 UNITS: 100 INJECTION, SOLUTION SUBCUTANEOUS at 06:46

## 2018-09-05 RX ADMIN — METOPROLOL SUCCINATE 50 MG: 50 TABLET, EXTENDED RELEASE ORAL at 06:48

## 2018-09-05 RX ADMIN — PREGABALIN 100 MG: 100 CAPSULE ORAL at 21:03

## 2018-09-05 RX ADMIN — INSULIN LISPRO 3 UNITS: 100 INJECTION, SOLUTION INTRAVENOUS; SUBCUTANEOUS at 21:01

## 2018-09-05 RX ADMIN — PREGABALIN 75 MG: 75 CAPSULE ORAL at 06:49

## 2018-09-05 RX ADMIN — BUDESONIDE AND FORMOTEROL FUMARATE DIHYDRATE 2 PUFF: 160; 4.5 AEROSOL RESPIRATORY (INHALATION) at 17:20

## 2018-09-05 RX ADMIN — ENALAPRIL MALEATE 5 MG: 5 TABLET ORAL at 06:48

## 2018-09-05 RX ADMIN — RANOLAZINE 1000 MG: 500 TABLET, FILM COATED, EXTENDED RELEASE ORAL at 17:20

## 2018-09-05 RX ADMIN — PREGABALIN 75 MG: 75 CAPSULE ORAL at 11:51

## 2018-09-05 RX ADMIN — INSULIN LISPRO 5 UNITS: 100 INJECTION, SOLUTION INTRAVENOUS; SUBCUTANEOUS at 06:46

## 2018-09-05 RX ADMIN — ASPIRIN 81 MG: 81 TABLET, DELAYED RELEASE ORAL at 06:48

## 2018-09-05 RX ADMIN — INSULIN GLARGINE 44 UNITS: 100 INJECTION, SOLUTION SUBCUTANEOUS at 17:24

## 2018-09-05 RX ADMIN — OMEPRAZOLE 20 MG: 20 CAPSULE, DELAYED RELEASE ORAL at 06:48

## 2018-09-05 ASSESSMENT — PAIN SCALES - GENERAL
PAINLEVEL_OUTOF10: 0

## 2018-09-05 ASSESSMENT — ENCOUNTER SYMPTOMS
PALPITATIONS: 0
VOMITING: 0
FEVER: 0
DIZZINESS: 0
FOCAL WEAKNESS: 0
LOSS OF CONSCIOUSNESS: 0
BRUISES/BLEEDS EASILY: 0
NECK PAIN: 0
CHILLS: 0
ABDOMINAL PAIN: 0
BLURRED VISION: 0
SHORTNESS OF BREATH: 0
NAUSEA: 0
DEPRESSION: 0
MYALGIAS: 0

## 2018-09-05 NOTE — PROGRESS NOTES
Bedside report received. A&Ox4.  POC discussed with pt; all questions answered at this time.  @ guards present.

## 2018-09-05 NOTE — CARE PLAN
Problem: Communication  Goal: The ability to communicate needs accurately and effectively will improve  Outcome: PROGRESSING AS EXPECTED  Pt able to effectively communicate needs    Problem: Pain Management  Goal: Pain level will decrease to patient's comfort goal  Outcome: PROGRESSING AS EXPECTED  Pain managed well under current regime. No c/o pain at this time. Pt is resting comfortably.

## 2018-09-05 NOTE — CARE PLAN
Problem: Communication  Goal: The ability to communicate needs accurately and effectively will improve    Intervention: Educate patient and significant other/support system about the plan of care, procedures, treatments, medications and allow for questions  Update pt on POC. Answer questions as needed.      Problem: Safety  Goal: Will remain free from falls    Intervention: Assess risk factors for falls  Pt educated on fall precautions. Bed in low, locked position. Call light within reach. Guards at the bedside. Treaded socks on pt. Hourly rounding in place.

## 2018-09-05 NOTE — CARE PLAN
Problem: Safety  Goal: Will remain free from injury  Outcome: PROGRESSING AS EXPECTED  Fall precautions in place. Bed in lowest position. Non-skid socks in place. Personal possessions within reach. Mobility sign on door. Bed-alarm on. Call light within reach. Pt educated regarding fall prevention and states understanding.       Problem: Knowledge Deficit  Goal: Knowledge of disease process/condition, treatment plan, diagnostic tests, and medications will improve  Outcome: PROGRESSING AS EXPECTED  Pt educated regarding plan of care and medications. All questions answered.

## 2018-09-05 NOTE — PROGRESS NOTES
Report received. Pt care assumed. Assessment performed. Pt AOx4. Pt laying supine in bed. Pt denies pain and no signs of distress. Bed in low, locked position. 2 guards at bedside. Call light within reach. Treaded socks on pt.  Hourly rounding in place.

## 2018-09-05 NOTE — PROGRESS NOTES
[facility] Medicine Daily Progress Note    Date of Service  9/4/2018    Chief Complaint  58 y.o. male admitted 9/1/2018 with chest pain      Interval Problem Update  Patient denied any significant chest discomfort today.  Pending discharge today, however patient was kept due to blood sugars becoming persistently high in the 200-400 range    Consultants/Specialty  CARDS    Disposition  Anticipate discharge likely tomorrow with better control of hyperglycemia    Review of Systems  Review of Systems   Constitutional: Negative for chills and fever.   HENT: Negative for hearing loss.    Eyes: Negative for blurred vision.   Respiratory: Negative for shortness of breath.    Cardiovascular: Positive for chest pain. Negative for palpitations and leg swelling.   Gastrointestinal: Negative for abdominal pain, nausea and vomiting.   Genitourinary: Negative for dysuria and urgency.   Musculoskeletal: Negative for myalgias and neck pain.   Neurological: Negative for dizziness, focal weakness and loss of consciousness.   Endo/Heme/Allergies: Does not bruise/bleed easily.   Psychiatric/Behavioral: Negative for depression.   All other systems reviewed and are negative.       Physical Exam  Blood Pressure: 106/67   Temperature: 36.8 °C (98.2 °F)   Pulse: 60   Respiration: 14   Pulse Oximetry: 93 %     Physical Exam   Constitutional: He is oriented to person, place, and time. He appears well-developed and well-nourished. No distress.   HENT:   Head: Normocephalic and atraumatic.   Mouth/Throat: No oropharyngeal exudate.   Eyes: Pupils are equal, round, and reactive to light. Right eye exhibits no discharge. Left eye exhibits no discharge.   Neck: Normal range of motion. Neck supple.   Cardiovascular: Normal rate, regular rhythm, normal heart sounds and intact distal pulses.    No murmur heard.  Pulmonary/Chest: Effort normal and breath sounds normal. No stridor. No respiratory distress. He has no wheezes. He has no rales.   Abdominal:  Soft. Bowel sounds are normal. There is no tenderness.   Musculoskeletal: Normal range of motion. He exhibits no edema or tenderness.   Neurological: He is alert and oriented to person, place, and time. No cranial nerve deficit.   Skin: Skin is warm and dry. No rash noted.   Psychiatric: He has a normal mood and affect.   Nursing note and vitals reviewed.      Fluids    Intake/Output Summary (Last 24 hours) at 09/04/18 1702  Last data filed at 09/04/18 1324   Gross per 24 hour   Intake             1040 ml   Output              500 ml   Net              540 ml       Laboratory  Recent Labs      09/01/18 2230 09/03/18   0056   WBC  6.6  6.3   RBC  4.52*  4.57*   HEMOGLOBIN  14.3  14.0   HEMATOCRIT  40.6*  41.4*   MCV  89.8  90.6   MCH  31.6  30.6   MCHC  35.2  33.8   RDW  39.8  39.7   PLATELETCT  152*  142*   MPV  10.3  10.3     Recent Labs      09/01/18 2230 09/03/18   0056   SODIUM  139  130*   POTASSIUM  3.6  4.1   CHLORIDE  107  103   CO2  24  21   GLUCOSE  188*  340*   BUN  23*  20   CREATININE  1.03  0.92   CALCIUM  8.8  9.2             Recent Labs      09/02/18   0450   TRIGLYCERIDE  80   HDL  30*   LDL  63       Imaging  ECHOCARDIOGRAM COMP W/O CONT   Final Result      NM-CARDIAC STRESS TEST   Final Result      CT-CTA CHEST PULMONARY ARTERY W/ RECONS   Final Result      No central or segmental pulmonary embolus is identified.      Dependent airspace opacities likely represent atelectasis. Slightly more confluent patchy opacity in the superior segment of the left lower lobe may be infectious/inflammatory. Follow-up is recommended.      Cardiomegaly.      Healing fractures of the anterior left third, fourth and fifth ribs.      Underlying emphysematous changes.      Small hiatal hernia.      1.2 cm nodule in the left upper quadrant may represent a splenule or an enlarged lymph node.      Duodenal diverticulum is likely present.                  DX-CHEST-PORTABLE (1 VIEW)   Final Result      No acute  cardiopulmonary process is seen.      Mild cardiomegaly.              Assessment/Plan  Type 2 diabetes mellitus, with long-term current use of insulin (HCC)- (present on admission)   Assessment & Plan    Patient remained in the hospital today primarily due to significant persistent hyperglycemia.  Blood sugars ranging anywhere from 200-400s.  -We will stop regular insulin low-dose sliding scale  -Start short acting Humalog 6 units scheduled q. before meals, with moderate intensity supplemental scale on top  -Continue home Lantus dosing  Check hemoglobin A1c-10.1, indicating poor control  Hypoglycemic protocol in place            Chest pain- (present on admission)   Assessment & Plan    -no clear e/o ACS  Initial EKG and troponin negative  -Nuc med cardiac perfusion testing reviewed with cardiology, no evidence of active reversible ischemia.  -continue outpatient ranexa, add nitro PRN, will consider imdur if need for further antianginal  Nitro and morphine when necessary for chest pain  -Patient denied any significant chest discomfort today, reports when it does occur it intermittently over his left chest, nonexertional.          Other emphysema (HCC)- (present on admission)   Assessment & Plan    Continue Dulera and albuterol as needed        Essential hypertension- (present on admission)   Assessment & Plan    Continue metoprolol and enalapril        Chronic systolic congestive heart failure (HCC)- (present on admission)   Assessment & Plan    Appears compensated at this time-no clinical changes noted today  Continue metoprolol and enalapril  Fluid and salt restriction        CAD (coronary artery disease)- (present on admission)   Assessment & Plan    Status post CABG and multiple stent placement  Continue aspirin, Plavix and atorvastatin

## 2018-09-06 VITALS
RESPIRATION RATE: 20 BRPM | BODY MASS INDEX: 24.89 KG/M2 | DIASTOLIC BLOOD PRESSURE: 73 MMHG | WEIGHT: 187.83 LBS | OXYGEN SATURATION: 92 % | TEMPERATURE: 98 F | HEIGHT: 73 IN | SYSTOLIC BLOOD PRESSURE: 112 MMHG | HEART RATE: 56 BPM

## 2018-09-06 LAB
GLUCOSE BLD-MCNC: 126 MG/DL (ref 65–99)
GLUCOSE BLD-MCNC: 147 MG/DL (ref 65–99)
GLUCOSE BLD-MCNC: 188 MG/DL (ref 65–99)

## 2018-09-06 PROCEDURE — 700102 HCHG RX REV CODE 250 W/ 637 OVERRIDE(OP): Performed by: INTERNAL MEDICINE

## 2018-09-06 PROCEDURE — A9270 NON-COVERED ITEM OR SERVICE: HCPCS | Performed by: SPECIALIST

## 2018-09-06 PROCEDURE — 90686 IIV4 VACC NO PRSV 0.5 ML IM: CPT | Performed by: HOSPITALIST

## 2018-09-06 PROCEDURE — 700111 HCHG RX REV CODE 636 W/ 250 OVERRIDE (IP): Performed by: HOSPITALIST

## 2018-09-06 PROCEDURE — 700102 HCHG RX REV CODE 250 W/ 637 OVERRIDE(OP): Performed by: SPECIALIST

## 2018-09-06 PROCEDURE — A9270 NON-COVERED ITEM OR SERVICE: HCPCS | Performed by: INTERNAL MEDICINE

## 2018-09-06 PROCEDURE — 99217 PR OBSERVATION CARE DISCHARGE: CPT | Performed by: HOSPITALIST

## 2018-09-06 PROCEDURE — 82962 GLUCOSE BLOOD TEST: CPT | Mod: 91

## 2018-09-06 PROCEDURE — 96372 THER/PROPH/DIAG INJ SC/IM: CPT

## 2018-09-06 PROCEDURE — G0378 HOSPITAL OBSERVATION PER HR: HCPCS

## 2018-09-06 PROCEDURE — 700102 HCHG RX REV CODE 250 W/ 637 OVERRIDE(OP): Performed by: HOSPITALIST

## 2018-09-06 PROCEDURE — 90471 IMMUNIZATION ADMIN: CPT

## 2018-09-06 RX ORDER — INSULIN GLARGINE 100 [IU]/ML
44 INJECTION, SOLUTION SUBCUTANEOUS EVERY EVENING
Qty: 10 ML
Start: 2018-09-06

## 2018-09-06 RX ORDER — INSULIN GLARGINE 100 [IU]/ML
44 INJECTION, SOLUTION SUBCUTANEOUS EVERY MORNING
Qty: 10 ML
Start: 2018-09-07

## 2018-09-06 RX ORDER — OMEPRAZOLE 20 MG/1
20 CAPSULE, DELAYED RELEASE ORAL DAILY
Qty: 30 CAP
Start: 2018-09-07

## 2018-09-06 RX ORDER — ALBUTEROL SULFATE 90 UG/1
2 AEROSOL, METERED RESPIRATORY (INHALATION) EVERY 4 HOURS PRN
Qty: 8.5 G
Start: 2018-09-06

## 2018-09-06 RX ORDER — BUDESONIDE AND FORMOTEROL FUMARATE DIHYDRATE 160; 4.5 UG/1; UG/1
2 AEROSOL RESPIRATORY (INHALATION) 2 TIMES DAILY
Start: 2018-09-06

## 2018-09-06 RX ORDER — ATORVASTATIN CALCIUM 40 MG/1
40 TABLET, FILM COATED ORAL EVERY EVENING
Qty: 30 TAB
Start: 2018-09-06

## 2018-09-06 RX ORDER — ASPIRIN 81 MG/1
81 TABLET ORAL DAILY
Qty: 30 TAB | Refills: 0
Start: 2018-09-07

## 2018-09-06 RX ORDER — CLOPIDOGREL BISULFATE 75 MG/1
75 TABLET ORAL DAILY
Qty: 30 TAB
Start: 2018-09-07

## 2018-09-06 RX ADMIN — RANOLAZINE 1000 MG: 500 TABLET, FILM COATED, EXTENDED RELEASE ORAL at 06:04

## 2018-09-06 RX ADMIN — PREGABALIN 75 MG: 75 CAPSULE ORAL at 06:05

## 2018-09-06 RX ADMIN — INSULIN GLARGINE 44 UNITS: 100 INJECTION, SOLUTION SUBCUTANEOUS at 06:11

## 2018-09-06 RX ADMIN — ATORVASTATIN CALCIUM 40 MG: 40 TABLET, FILM COATED ORAL at 16:50

## 2018-09-06 RX ADMIN — ENALAPRIL MALEATE 5 MG: 5 TABLET ORAL at 06:05

## 2018-09-06 RX ADMIN — BUDESONIDE AND FORMOTEROL FUMARATE DIHYDRATE 2 PUFF: 160; 4.5 AEROSOL RESPIRATORY (INHALATION) at 16:49

## 2018-09-06 RX ADMIN — INFLUENZA A VIRUS A/MICHIGAN/45/2015 X-275 (H1N1) ANTIGEN (FORMALDEHYDE INACTIVATED), INFLUENZA A VIRUS A/SINGAPORE/INFIMH-16-0019/2016 IVR-186 (H3N2) ANTIGEN (FORMALDEHYDE INACTIVATED), INFLUENZA B VIRUS B/PHUKET/3073/2013 ANTIGEN (FORMALDEHYDE INACTIVATED), AND INFLUENZA B VIRUS B/MARYLAND/15/2016 BX-69A ANTIGEN (FORMALDEHYDE INACTIVATED) 0.5 ML: 15; 15; 15; 15 INJECTION, SUSPENSION INTRAMUSCULAR at 13:04

## 2018-09-06 RX ADMIN — METOPROLOL SUCCINATE 50 MG: 50 TABLET, EXTENDED RELEASE ORAL at 06:05

## 2018-09-06 RX ADMIN — BUDESONIDE AND FORMOTEROL FUMARATE DIHYDRATE 2 PUFF: 160; 4.5 AEROSOL RESPIRATORY (INHALATION) at 06:06

## 2018-09-06 RX ADMIN — INSULIN GLARGINE 44 UNITS: 100 INJECTION, SOLUTION SUBCUTANEOUS at 16:58

## 2018-09-06 RX ADMIN — OMEPRAZOLE 20 MG: 20 CAPSULE, DELAYED RELEASE ORAL at 06:05

## 2018-09-06 RX ADMIN — RANOLAZINE 1000 MG: 500 TABLET, FILM COATED, EXTENDED RELEASE ORAL at 16:50

## 2018-09-06 RX ADMIN — CLOPIDOGREL 75 MG: 75 TABLET, FILM COATED ORAL at 06:05

## 2018-09-06 RX ADMIN — INSULIN LISPRO 2 UNITS: 100 INJECTION, SOLUTION INTRAVENOUS; SUBCUTANEOUS at 16:57

## 2018-09-06 RX ADMIN — PREGABALIN 75 MG: 75 CAPSULE ORAL at 11:12

## 2018-09-06 RX ADMIN — ASPIRIN 81 MG: 81 TABLET, DELAYED RELEASE ORAL at 06:05

## 2018-09-06 ASSESSMENT — PAIN SCALES - GENERAL
PAINLEVEL_OUTOF10: 0

## 2018-09-06 NOTE — CARE PLAN
Problem: Communication  Goal: The ability to communicate needs accurately and effectively will improve  Outcome: PROGRESSING AS EXPECTED  Pt able to effectively communicate needs.    Problem: Safety  Goal: Will remain free from injury  Outcome: PROGRESSING AS EXPECTED  Pt remains safe and free of falls. Bed locked and in lowest position. Pt utilizes call bell system appropriately for assistance.

## 2018-09-06 NOTE — DISCHARGE SUMMARY
Discharge Summary    CHIEF COMPLAINT ON ADMISSION  Chief Complaint   Patient presents with   • Chest Pressure     mid sternal pressure that radiates to (L) jaw and arm,  8/10 at this time,  started this am after working out.         Reason for Admission  EMS     CODE STATUS  Full Code    HPI & HOSPITAL COURSE  This is a 58 y.o. male here with past medical history of CAD status post cardiac bypass, multiple stents who was transferred to [facility] due to complaints of chest pain.  Given his complicated history, cardiology was consulted for further evaluation.  Nuclear perfusion imaging was done, has known infarct but without significant ischemia which per cardiology portended a good prognostic output at least from a coronary standpoint.  Doubtful the patient's chest pain was caused by a culprit coronary vessel.  Troponins were otherwise negative which was also reassuring.  Recommendation was to continue medical management with dual antiplatelet therapy, statin, ACE inhibitor, other blood pressure control.  Omeprazole was added to 4 potential GERD relief.  By the time for patient's discharge, he otherwise remained chest pain-free.    Patient stay was prolonged/complicated due to severe hyperglycemia.  His A1c was greater than 10 and it seems patient was just on basal insulin.  We eventually titrated patient to a basal bolus regimen with with much better control and his blood sugars as below.  I will defer this to his providers and his senior living for further up titration of his insulin needs.    Patient claims he was a prior heavy smoker and reports he was diagnosed with emphysema.  Notes his breathing was much better after we started Symbicort and albuterol, I will place this medication as a recommendation for discharge.  I would imagine it would be reasonable to supplement his inhalers with what ever is on formulary at his senior living.  His present caretakers can also consider Spiriva, LABA, and albuterol as well for probable  COPD management for the long-term.       Therefore, he is discharged in good and stable condition to court or custody of law enforcement.    The patient met 2-midnight criteria for an inpatient stay at the time of discharge.      FOLLOW UP ITEMS POST DISCHARGE  Please further evaluate blood sugars and titrate his basal bolus regimen for added control    I made some recommendations on COPD management as above, please consider whatever you may have on formulary at your prison that would be similar to Symbicort versus a consideration of long-acting bronchodilator with Spiriva and albuterol as a rescue inhaler.  Either way patient was breathing well on discharge.    Would continue medical management for his cardiac conditions as below.    CT angiogram on 9/2 did show some confluent patchy opacity in the superior segment of his left upper lobe, he had no clinical signs or symptoms of an infection, he did have some atelectasis, please consider repeat chest imaging in 3 months given his smoking history.    DISCHARGE DIAGNOSES  Active Problems:    Chest pain POA: Yes    Type 2 diabetes mellitus, with long-term current use of insulin (HCC) POA: Yes    CAD (coronary artery disease) POA: Yes    Chronic systolic congestive heart failure (HCC) POA: Yes    Essential hypertension POA: Yes    Other emphysema (HCC) POA: Yes  Resolved Problems:    * No resolved hospital problems. *      FOLLOW UP  No future appointments.  No follow-up provider specified.    MEDICATIONS ON DISCHARGE     Medication List      START taking these medications      Instructions   albuterol 108 (90 Base) MCG/ACT Aers inhalation aerosol   Inhale 2 Puffs by mouth every four hours as needed for Shortness of Breath.  Dose:  2 Puff     aspirin 81 MG EC tablet   Take 1 Tab by mouth every day.  Dose:  81 mg     atorvastatin 40 MG Tabs  Commonly known as:  LIPITOR   Take 1 Tab by mouth every evening.  Dose:  40 mg     budesonide-formoterol 160-4.5 MCG/ACT  Aero  Commonly known as:  SYMBICORT   Doctor's comments:  Or equivalent inhaler at assisted  Inhale 2 Puffs by mouth 2 Times a Day.  Dose:  2 Puff     * insulin lispro 100 UNIT/ML Soln  Commonly known as:  HUMALOG   Inject 10 Units as instructed 3 times a day before meals.  Dose:  10 Units     * insulin lispro 100 UNIT/ML Soln  Commonly known as:  HUMALOG   Inject 2-9 Units as instructed 4 Times a Day,Before Meals and at Bedtime.  Dose:  2-9 Units     omeprazole 20 MG delayed-release capsule  Commonly known as:  PRILOSEC   Take 1 Cap by mouth every day.  Dose:  20 mg        * This list has 2 medication(s) that are the same as other medications prescribed for you. Read the directions carefully, and ask your doctor or other care provider to review them with you.            CHANGE how you take these medications      Instructions   * insulin glargine 100 UNIT/ML Soln  What changed:  · how much to take  · when to take this  Commonly known as:  LANTUS   Inject 44 Units as instructed every evening.  Dose:  44 Units     * insulin glargine 100 UNIT/ML Soln  What changed:  how much to take  Commonly known as:  LANTUS   Inject 44 Units as instructed every morning.  Dose:  44 Units        * This list has 2 medication(s) that are the same as other medications prescribed for you. Read the directions carefully, and ask your doctor or other care provider to review them with you.            CONTINUE taking these medications      Instructions   clopidogrel 75 MG Tabs  Commonly known as:  PLAVIX   Take 1 Tab by mouth every day.  Dose:  75 mg     enalapril 5 MG Tabs  Commonly known as:  VASOTEC   Take 5 mg by mouth every day.  Dose:  5 mg     hydroCHLOROthiazide 25 MG Tabs  Commonly known as:  HYDRODIURIL   Take 25 mg by mouth every day.  Dose:  25 mg     metoprolol SR 50 MG Tb24  Commonly known as:  TOPROL XL   Take 50 mg by mouth every day.  Dose:  50 mg     * pregabalin 75 MG Caps  Commonly known as:  LYRICA   Take 75 mg by mouth 2  "times a day.  Dose:  75 mg     * pregabalin 100 MG Caps  Commonly known as:  LYRICA   Take 100 mg by mouth every bedtime.  Dose:  100 mg     raNITidine 150 MG Tabs  Commonly known as:  ZANTAC   Take 150 mg by mouth 2 times a day.  Dose:  150 mg     ranolazine 500 MG Tb12  Commonly known as:  RANEXA   Take 1,000 mg by mouth 2 times a day.  Dose:  1000 mg        * This list has 2 medication(s) that are the same as other medications prescribed for you. Read the directions carefully, and ask your doctor or other care provider to review them with you.            STOP taking these medications    insulin regular 100 Unit/mL Soln  Commonly known as:  HUMULIN R            Allergies  Allergies   Allergen Reactions   • Peanut (Diagnostic) Anaphylaxis   • Carvacrol Shortness of Breath   • Codeine Rash         • Isordil [Isosorbide] Shortness of Breath   • Ketorolac Unspecified      \"doesn't remember\"       DIET  Orders Placed This Encounter   Procedures   • Diet Order Diabetic (ok for 2nd meal tray if exceeds cardiac diabetic 2G NA restriction), Cardiac, 2 Gram Sodium     Standing Status:   Standing     Number of Occurrences:   1     Order Specific Question:   Diet:     Answer:   Diabetic [3]     Comments:   ok for 2nd meal tray if exceeds cardiac diabetic 2G NA restriction     Order Specific Question:   Diet:     Answer:   Cardiac [6]     Order Specific Question:   Diet:     Answer:   2 Gram Sodium [7]     Order Specific Question:   Miscellaneous modifications:     Answer:   No Decaf, No Caffeine(for test) [11]     Comments:   Protocol 1313 Patient to have no caffeine for 12 hours prior to exam (decaf, coffee, cola, tea, chocolate)       ACTIVITY  As tolerated.  Weight bearing as tolerated    LINES, DRAINS, AND WOUNDS  This is an automated list. Peripheral IVs will be removed prior to discharge.  PIV Group Left Forearm 18g Flexible Catheter (Active)   Line Secured Taped;Transparent 9/6/2018  8:00 AM   Site Condition / " Description Assessed;Patent;Clean;Dry;Intact 9/6/2018  8:00 AM   Dressing Type / Description Transparent;Clean;Dry;Intact 9/6/2018  8:00 AM   Dressing Status Observed 9/6/2018  8:00 AM   Saline Locked Yes 9/6/2018  8:00 AM   Infiltration Grading Used by [facility] 0 9/6/2018  8:00 AM   Phlebitis Scale (Used by [facility]) 0 9/6/2018  8:00 AM                     MENTAL STATUS ON TRANSFER  Level of Consciousness: Alert          CONSULTATIONS  Cardiology    PROCEDURES  None    LABORATORY  Lab Results   Component Value Date    SODIUM 130 (L) 09/03/2018    POTASSIUM 4.1 09/03/2018    CHLORIDE 103 09/03/2018    CO2 21 09/03/2018    GLUCOSE 340 (H) 09/03/2018    BUN 20 09/03/2018    CREATININE 0.92 09/03/2018        Lab Results   Component Value Date    WBC 6.3 09/03/2018    HEMOGLOBIN 14.0 09/03/2018    HEMATOCRIT 41.4 (L) 09/03/2018    PLATELETCT 142 (L) 09/03/2018        Total time of the discharge process exceeds 35 minutes.

## 2018-09-06 NOTE — PROGRESS NOTES
Anticipated Discharge Disposition: [facility] longterm    Action: LSW spoke with guard at bedside who gave LSW the number for [facility]term ([phone]). LSW called [facility]term and spoke with Dylan ([phone]) who requested discharge summary and cardiology consults for pt. LSW completed quick release and faxed information to Dylan ([phone]).    Barriers to Discharge: None    Plan: Awaiting acceptance from Ana with [facility].

## 2018-09-06 NOTE — DISCHARGE INSTRUCTIONS
Discharge Instructions    Discharged to other by medical transportation with escort. Discharged via wheelchair, hospital escort: Yes.  Special equipment needed: Not Applicable    Be sure to schedule a follow-up appointment with your primary care doctor or any specialists as instructed.     Discharge Plan:   Diet Plan: Discussed  Activity Level: Discussed  Confirmed Follow up Appointment: No (Comments) (PCP)  Confirmed Symptoms Management: Discussed  Medication Reconciliation Updated: Yes  Influenza Vaccine Indication: Indicated: 9 to 64 years of age  Influenza Vaccine Given - only chart on this line when given: Influenza Vaccine Given (See MAR)    I understand that a diet low in cholesterol, fat, and sodium is recommended for good health. Unless I have been given specific instructions below for another diet, I accept this instruction as my diet prescription.       Special Instructions:   HF Patient Discharge Instructions  · Monitor your weight daily, and maintain a weight chart, to track your weight changes.   · Activity as tolerated, unless your Doctor has ordered otherwise.   · Follow a low fat, low cholesterol, low salt diet unless instructed otherwise by your Doctor. Read the labels on the back of food products and track your intake of fat, cholesterol and salt.   · Fluid Restriction No. If a Fluid Restriction has been ordered by your Doctor, measure fluids with a measuring cup to ensure that you are not exceeding the restriction.   · No smoking.  · Oxygen No. If your Doctor has ordered that you wear Oxygen at home, it is important to wear it as ordered.  · Did you receive an explanation from staff on the importance of taking each of your medications and why it is necessary to keep taking them unless your doctor says to stop? Yes   ·   · Were all of your questions answered about how to manage your heart failure and what to do if you have increased signs and symptoms after you go home? Yes  · Do you feel like  your heart failure care team involved you in the care treatment plan and allowed you to make decisions regarding your care while in the hospital and addressed any discharge needs you might have? Yes    See the educational handout provided at discharge for more information on monitoring your daily weight, activity and diet. This also explains more about Heart Failure, symptoms of a flare-up and some of the tests that you have undergone.     Warning Signs of a Flare-Up include:  · Swelling in the ankles or lower legs.  · Shortness of breath, while at rest, or while doing normal activities.   · Shortness of breath at night when in bed, or coughing in bed.   · Requiring more pillows to sleep at night, or needing to sit up at night to sleep.  · Feeling weak, dizzy or fatigued.     When to call your Doctor:  · Call [facility] seven days a week from 8:00 a.m. to 8:00 p.m. for medical questions [phone].  · Call your Primary Care Physician or Cardiologist if:   1. You experience any pain radiating to your jaw or neck.  2. You have any difficulty breathing.  3. You experience weight gain of 3 lbs in a day or 5 lbs in a week.   4. You feel any palpitations or irregular heartbeats.  5. You become dizzy or lose consciousness.   If you have had an angiogram or had a pacemaker or AICD placed, and experience:  1. Bleeding, drainage or swelling at the surgical / puncture site.  2. Fever greater than 100.0 F  3. Shock from internal defibrillator.  4. Cool and / or numb extremities.      · Is patient discharged on Warfarin / Coumadin?   No     Depression / Suicide Risk    As you are discharged from this [facility], it is important to learn how to keep safe from harming yourself.    Recognize the warning signs:  · Abrupt changes in personality, positive or negative- including increase in energy   · Giving away possessions  · Change in eating patterns- significant weight changes-  positive or  negative  · Change in sleeping patterns- unable to sleep or sleeping all the time   · Unwillingness or inability to communicate  · Depression  · Unusual sadness, discouragement and loneliness  · Talk of wanting to die  · Neglect of personal appearance   · Rebelliousness- reckless behavior  · Withdrawal from people/activities they love  · Confusion- inability to concentrate     If you or a loved one observes any of these behaviors or has concerns about self-harm, here's what you can do:  · Talk about it- your feelings and reasons for harming yourself  · Remove any means that you might use to hurt yourself (examples: pills, rope, extension cords, firearm)  · Get professional help from the community (Mental Health, Substance Abuse, psychological counseling)  · Do not be alone:Call your Safe Contact- someone whom you trust who will be there for you.  · Call your local CRISIS HOTLINE 079-7233 or 801-021-0481  · Call your local Children's Mobile Crisis Response Team [place] (180) 598-8481 or www.[employer]  · Call the toll free National Suicide Prevention Hotlines   · National Suicide Prevention Lifeline 918-249-EOMG (7343)  · National Hope Line Network 800-SUICIDE (783-1845)          Hyperglycemia  Hyperglycemia is when the sugar (glucose) level in your blood is too high. It may not cause symptoms. If you do have symptoms, they may include warning signs, such as:  · Feeling more thirsty than normal.  · Hunger.  · Feeling tired.  · Needing to pee (urinate) more than normal.  · Blurry eyesight (vision).  You may get other symptoms as it gets worse, such as:  · Dry mouth.  · Not being hungry (loss of appetite).  · Fruity-smelling breath.  · Weakness.  · Weight gain or loss that is not planned. Weight loss may be fast.  · A tingling or numb feeling in your hands or feet.  · Headache.  · Skin that does not bounce back quickly when it is lightly pinched and released (poor skin turgor).  · Pain in your belly  (abdomen).  · Cuts or bruises that heal slowly.  High blood sugar can happen to people who do or do not have diabetes. High blood sugar can happen slowly or quickly, and it can be an emergency.  Follow these instructions at home:  General instructions  · Take over-the-counter and prescription medicines only as told by your doctor.  · Do not use products that contain nicotine or tobacco, such as cigarettes and e-cigarettes. If you need help quitting, ask your doctor.  · Limit alcohol intake to no more than 1 drink per day for nonpregnant women and 2 drinks per day for men. One drink equals 12 oz of beer, 5 oz of wine, or 1½ oz of hard liquor.  · Manage stress. If you need help with this, ask your doctor.  · Keep all follow-up visits as told by your doctor. This is important.  Eating and drinking  · Stay at a healthy weight.  · Exercise regularly, as told by your doctor.  · Drink enough fluid, especially when you:  ¨ Exercise.  ¨ Get sick.  ¨ Are in hot temperatures.  · Eat healthy foods, such as:  ¨ Low-fat (lean) proteins.  ¨ Complex carbs (complex carbohydrates), such as whole wheat bread or brown rice.  ¨ Fresh fruits and vegetables.  ¨ Low-fat dairy products.  ¨ Healthy fats.  · Drink enough fluid to keep your pee (urine) clear or pale yellow.  If you have diabetes:  · Make sure you know the symptoms of hyperglycemia.  · Follow your diabetes management plan, as told by your doctor. Make sure you:  ¨ Take insulin and medicines as told.  ¨ Follow your exercise plan.  ¨ Follow your meal plan. Eat on time. Do not skip meals.  ¨ Check your blood sugar as often as told. Make sure to check before and after exercise. If you exercise longer or in a different way than you normally do, check your blood sugar more often.  ¨ Follow your sick day plan whenever you cannot eat or drink normally. Make this plan ahead of time with your doctor.  · Share your diabetes management plan with people in your workplace, school, and  household.  · Check your urine for ketones when you are ill and as told by your doctor.  · Carry a card or wear jewelry that says that you have diabetes.  Contact a doctor if:  · Your blood sugar level is higher than 240 mg/dL (13.3 mmol/L) for 2 days in a row.  · You have problems keeping your blood sugar in your target range.  · High blood sugar happens often for you.  Get help right away if:  · You have trouble breathing.  · You have a change in how you think, feel, or act (mental status).  · You feel sick to your stomach (nauseous), and that feeling does not go away.  · You cannot stop throwing up (vomiting).  These symptoms may be an emergency. Do not wait to see if the symptoms will go away. Get medical help right away. Call your local emergency services (911 in the [place] Do not drive yourself to the hospital.   Summary  · Hyperglycemia is when the sugar (glucose) level in your blood is too high.  · High blood sugar can happen to people who do or do not have diabetes.  · Make sure you drink enough fluids, eat healthy foods, and exercise regularly.  · Contact your doctor if you have problems keeping your blood sugar in your target range.  This information is not intended to replace advice given to you by your health care provider. Make sure you discuss any questions you have with your health care provider.  Document Released: 10/15/2010 Document Revised: 09/04/2017 Document Reviewed: 09/04/2017  [employer] Patient Education © 2017 [employer]      Chest Pain, Nonspecific  It is often hard to give a specific diagnosis for the cause of chest pain. There is always a chance that your pain could be related to something serious, like a heart attack or a blood clot in the lungs. You need to follow up with your caregiver for further evaluation. More lab tests or other studies such as X-rays, electrocardiography, stress testing, or cardiac imaging may be needed to find the cause of your pain.  Most of the  time, nonspecific chest pain improves within 2 to 3 days with rest and mild pain medicine. For the next few days, avoid physical exertion or activities that bring on pain. Do not smoke. Avoid drinking alcohol. Call your caregiver for routine follow-up as advised.   SEEK IMMEDIATE MEDICAL CARE IF:  · You develop increased chest pain or pain that radiates to the arm, neck, jaw, back, or abdomen.   · You develop shortness of breath, increased coughing, or you start coughing up blood.   · You have severe back or abdominal pain, nausea, or vomiting.   · You develop severe weakness, fainting, fever, or chills.   Document Released: 12/18/2006 Document Revised: 03/11/2013 Document Reviewed: 06/06/2008  [employer]® Patient Information ©2013 [employer].        Acute Coronary Syndrome  Acute coronary syndrome (ACS) is a serious problem in which there is suddenly not enough blood and oxygen supplied to the heart. ACS may mean that one or more of the blood vessels in your heart (coronary arteries) may be blocked. ACS can result in chest pain or a heart attack (myocardial infarction or MI).  What are the causes?  This condition is caused by atherosclerosis, which is the buildup of fat and cholesterol (plaque) on the inside of the arteries. Over time, the plaque may narrow or block the artery, and this will lessen blood flow to the heart. Plaque can also become weak and break off within a coronary artery to form a clot and cause a sudden blockage.  What increases the risk?  The risk factors of this condition include:  · High cholesterol levels.  · High blood pressure (hypertension).  · Smoking.  · Diabetes.  · Age.  · Family history of chest pain, heart disease, or stroke.  · Lack of exercise.  What are the signs or symptoms?  The most common signs of this condition include:  · Chest pain, which can be:  ¨ A crushing or squeezing in the chest.  ¨ A tightness, pressure, fullness, or heaviness in the chest.  ¨ Present for more than  a few minutes, or it can stop and recur.  · Pain in the arms, neck, jaw, or back.  · Unexplained heartburn or indigestion.  · Shortness of breath.  · Nausea.  · Sudden cold sweats.  · Feeling light-headed or dizzy.  Sometimes, this condition has no symptoms.  How is this diagnosed?  ACS may be diagnosed through the following tests:  · Electrocardiogram (ECG).  · Blood tests.  · Coronary angiogram. This is a procedure to look at the coronary arteries to see if there is any blockage.  How is this treated?  Treatment for ACS may include:  · Healthy behavioral changes to reduce or control risk factors.  · Medicine.  · Coronary stenting. A stent helps to keep an artery open.  · Coronary angioplasty. This procedure widens a narrowed or blocked artery.  · Coronary artery bypass surgery. This will allow your blood to pass the blockage (bypass) to reach your heart.  Follow these instructions at home:  Eating and drinking  · Follow a heart-healthy diet. A dietitian can you help to educate you about healthy food options and changes.  · Use healthy cooking methods such as roasting, grilling, broiling, baking, poaching, steaming, or stir-frying. Talk to a dietitian to learn more about healthy cooking methods.  Medicines  · Take medicines only as directed by your health care provider.  · Do not take the following medicines unless your health care provider approves:  ¨ Nonsteroidal anti-inflammatory drugs (NSAIDs), such as ibuprofen, naproxen, or celecoxib.  ¨ Vitamin supplements that contain vitamin A, vitamin E, or both.  ¨ Hormone replacement therapy that contains estrogen with or without progestin.  · Stop illegal drug use.  Activity  · Follow an exercise program that is approved by your health care provider.  · Plan rest periods when you are fatigued.  Lifestyle  · Do not use any tobacco products, including cigarettes, chewing tobacco, or electronic cigarettes. If you need help quitting, ask your health care provider.  · If  you drink alcohol, and your health care provider approves, limit your alcohol intake to no more than 1 drink per day. One drink equals 12 ounces of beer, 5 ounces of wine, or 1½ ounces of hard liquor.  · Learn to manage stress.  · Maintain a healthy weight. Lose weight as approved by your health care provider.  General instructions  · Manage other health conditions, such as hypertension and diabetes, as directed by your health care provider.  · Keep all follow-up visits as directed by your health care provider. This is important.  · Your health care provider may ask you to monitor your blood pressure. A blood pressure reading consists of a higher number over a lower number, such as 110 over 72, written as 110/72. Ideally, your blood pressure should be:  ¨ Below 140/90 if you have no other medical conditions.  ¨ Below 130/80 if you have diabetes or kidney disease.  Get help right away if:  · You have pain in your chest, neck, arm, jaw, stomach, or back that lasts more than a few minutes, is recurring, or is not relieved by taking medicine under your tongue (sublingual nitroglycerin).  · You have profuse sweating without cause.  · You have unexplained:  ¨ Heartburn or indigestion.  ¨ Shortness of breath or difficulty breathing.  ¨ Nausea or vomiting.  ¨ Fatigue.  ¨ Feelings of nervousness or anxiety.  ¨ Weakness.  ¨ Diarrhea.  · You have sudden light-headedness or dizziness.  · You faint.  These symptoms may represent a serious problem that is an emergency. Do not wait to see if the symptoms will go away. Get medical help right away. Call your local emergency services (911 in the [place] Do not drive yourself to the clinic or hospital.   This information is not intended to replace advice given to you by your health care provider. Make sure you discuss any questions you have with your health care provider.  Document Released: 12/18/2006 Document Revised: 05/31/2017 Document Reviewed: 04/21/2015  [employer]  Patient Education © 2017 [employer]      Heart Failure  Heart failure is a condition in which the heart has trouble pumping blood because it has become weak or stiff. This means that the heart does not pump blood efficiently for the body to work well. For some people with heart failure, fluid may back up into the lungs and there may be swelling (edema) in the lower legs. Heart failure is usually a long-term (chronic) condition. It is important for you to take good care of yourself and follow the treatment plan from your health care provider.  What are the causes?  This condition is caused by some health problems, including:  · High blood pressure (hypertension). Hypertension causes the heart muscle to work harder than normal. High blood pressure eventually causes the heart to become stiff and weak.  · Coronary artery disease (CAD). CAD is the buildup of cholesterol and fat (plaques) in the arteries of the heart.  · Heart attack (myocardial infarction). Injured tissue, which is caused by the heart attack, does not contract as well and the heart's ability to pump blood is weakened.  · Abnormal heart valves. When the heart valves do not open and close properly, the heart muscle must pump harder to keep the blood flowing.  · Heart muscle disease (cardiomyopathy or myocarditis). Heart muscle disease is damage to the heart muscle from a variety of causes, such as drug or alcohol abuse, infections, or unknown causes. These can increase the risk of heart failure.  · Lung disease. When the lungs do not work properly, the heart must work harder.  What increases the risk?  Risk of heart failure increases as a person ages. This condition is also more likely to develop in people who:  · Are overweight.  · Are male.  · Smoke or chew tobacco.  · Abuse alcohol or illegal drugs.  · Have taken medicines that can damage the heart, such as chemotherapy drugs.  · Have diabetes.  ¨ High blood sugar (glucose) is associated with high  fat (lipid) levels in the blood.  ¨ Diabetes can also damage tiny blood vessels that carry nutrients to the heart muscle.  · Have abnormal heart rhythms.  · Have thyroid problems.  · Have low blood counts (anemia).  What are the signs or symptoms?  Symptoms of this condition include:  · Shortness of breath with activity, such as when climbing stairs.  · Persistent cough.  · Swelling of the feet, ankles, legs, or abdomen.  · Unexplained weight gain.  · Difficulty breathing when lying flat (orthopnea).  · Waking from sleep because of the need to sit up and get more air.  · Rapid heartbeat.  · Fatigue and loss of energy.  · Feeling light-headed, dizzy, or close to fainting.  · Loss of appetite.  · Nausea.  · Increased urination during the night (nocturia).  · Confusion.  How is this diagnosed?  This condition is diagnosed based on:  · Medical history, symptoms, and a physical exam.  · Diagnostic tests, which may include:  ¨ Echocardiogram.  ¨ Electrocardiogram (ECG).  ¨ Chest X-ray.  ¨ Blood tests.  ¨ Exercise stress test.  ¨ Radionuclide scans.  ¨ Cardiac catheterization and angiogram.  How is this treated?  Treatment for this condition is aimed at managing the symptoms of heart failure. Medicines, behavioral changes, or other treatments may be necessary to treat heart failure.  Medicines   These may include:  · Angiotensin-converting enzyme (ACE) inhibitors. This type of medicine blocks the effects of a blood protein called angiotensin-converting enzyme. ACE inhibitors relax (dilate) the blood vessels and help to lower blood pressure.  · Angiotensin receptor blockers (ARBs). This type of medicine blocks the actions of a blood protein called angiotensin. ARBs dilate the blood vessels and help to lower blood pressure.  · Water pills (diuretics). Diuretics cause the kidneys to remove salt and water from the blood. The extra fluid is removed through urination, leaving a lower volume of blood that the heart has to  pump.  · Beta blockers. These improve heart muscle strength and they prevent the heart from beating too quickly.  · Digoxin. This increases the force of the heartbeat.  Healthy behavior changes   These may include:  · Reaching and maintaining a healthy weight.  · Stopping smoking or chewing tobacco.  · Eating heart-healthy foods.  · Limiting or avoiding alcohol.  · Stopping use of street drugs (illegal drugs).  · Physical activity.  Other treatments   These may include:  · Surgery to open blocked coronary arteries or repair damaged heart valves.  · Placement of a biventricular pacemaker to improve heart muscle function (cardiac resynchronization therapy). This device paces both the right ventricle and left ventricle.  · Placement of a device to treat serious abnormal heart rhythms (implantable cardioverter defibrillator, or ICD).  · Placement of a device to improve the pumping ability of the heart (left ventricular assist device, or LVAD).  · Heart transplant. This can cure heart failure, and it is considered for certain patients who do not improve with other therapies.  Follow these instructions at home:  Medicines  · Take over-the-counter and prescription medicines only as told by your health care provider. Medicines are important in reducing the workload of your heart, slowing the progression of heart failure, and improving your symptoms.  ¨ Do not stop taking your medicine unless your health care provider told you to do that.  ¨ Do not skip any dose of medicine.  ¨ Refill your prescriptions before you run out of medicine. You need your medicines every day.  Eating and drinking  · Eat heart-healthy foods. Talk with a dietitian to make an eating plan that is right for you.  ¨ Choose foods that contain no trans fat and are low in saturated fat and cholesterol. Healthy choices include fresh or frozen fruits and vegetables, fish, lean meats, legumes, fat-free or low-fat dairy products, and whole-grain or high-fiber  foods.  ¨ Limit salt (sodium) if directed by your health care provider. Sodium restriction may reduce symptoms of heart failure. Ask a dietitian to recommend heart-healthy seasonings.  ¨ Use healthy cooking methods instead of frying. Healthy methods include roasting, grilling, broiling, baking, poaching, steaming, and stir-frying.  · Limit your fluid intake if directed by your health care provider. Fluid restriction may reduce symptoms of heart failure.  Lifestyle  · Stop smoking or using chewing tobacco. Nicotine and tobacco can damage your heart and your blood vessels. Do not use nicotine gum or patches before talking to your health care provider.  · Limit alcohol intake to no more than 1 drink per day for non-pregnant women and 2 drinks per day for men. One drink equals 12 oz of beer, 5 oz of wine, or 1½ oz of hard liquor.  ¨ Drinking more than that is harmful to your heart. Tell your health care provider if you drink alcohol several times a week.  ¨ Talk with your health care provider about whether any level of alcohol use is safe for you.  ¨ If your heart has already been damaged by alcohol or you have severe heart failure, drinking alcohol should be stopped completely.  · Stop use of illegal drugs.  · Lose weight if directed by your health care provider. Weight loss may reduce symptoms of heart failure.  · Do moderate physical activity if directed by your health care provider. People who are elderly and people with severe heart failure should consult with a health care provider for physical activity recommendations.  Monitor important information  · Weigh yourself every day. Keeping track of your weight daily helps you to notice excess fluid sooner.  ¨ Weigh yourself every morning after you urinate and before you eat breakfast.  ¨ Wear the same amount of clothing each time you weigh yourself.  ¨ Record your daily weight. Provide your health care provider with your weight record.  · Monitor and record your  blood pressure as told by your health care provider.  · Check your pulse as told by your health care provider.  Dealing with extreme temperatures  · If the weather is extremely hot:  ¨ Avoid vigorous physical activity.  ¨ Use air conditioning or fans or seek a cooler location.  ¨ Avoid caffeine and alcohol.  ¨ Wear loose-fitting, lightweight, and light-colored clothing.  · If the weather is extremely cold:  ¨ Avoid vigorous physical activity.  ¨ Layer your clothes.  ¨ Wear mittens or gloves, a hat, and a scarf when you go outside.  ¨ Avoid alcohol.  General instructions  · Manage other health conditions such as hypertension, diabetes, thyroid disease, or abnormal heart rhythms as told by your health care provider.  · Learn to manage stress. If you need help to do this, ask your health care provider.  · Plan rest periods when fatigued.  · Get ongoing education and support as needed.  · Participate in or seek rehabilitation as needed to maintain or improve independence and quality of life.  · Stay up to date with immunizations. Keeping current on pneumococcal and influenza immunizations is especially important to prevent respiratory infections.  · Keep all follow-up visits as told by your health care provider. This is important.  Contact a health care provider if:  · You have a rapid weight gain.  · You have increasing shortness of breath that is unusual for you.  · You are unable to participate in your usual physical activities.  · You tire easily.  · You cough more than normal, especially with physical activity.  · You have any swelling or more swelling in areas such as your hands, feet, ankles, or abdomen.  · You are unable to sleep because it is hard to breathe.  · You feel like your heart is beating quickly (palpitations).  · You become dizzy or light-headed when you stand up.  Get help right away if:  · You have difficulty breathing.  · You notice or your family notices a change in your awareness, such as having  trouble staying awake or having difficulty with concentration.  · You have pain or discomfort in your chest.  · You have an episode of fainting (syncope).  This information is not intended to replace advice given to you by your health care provider. Make sure you discuss any questions you have with your health care provider.  Document Released: 12/18/2006 Document Revised: 08/22/2017 Document Reviewed: 07/12/2017  [employer] Patient Education © 2017 [employer]        Hypertension  Hypertension is another name for high blood pressure. High blood pressure forces your heart to work harder to pump blood. A blood pressure reading has two numbers, which includes a higher number over a lower number (example: 110/72).  Follow these instructions at home:  · Have your blood pressure rechecked by your doctor.  · Only take medicine as told by your doctor. Follow the directions carefully. The medicine does not work as well if you skip doses. Skipping doses also puts you at risk for problems.  · Do not smoke.  · Monitor your blood pressure at home as told by your doctor.  Contact a doctor if:  · You think you are having a reaction to the medicine you are taking.  · You have repeat headaches or feel dizzy.  · You have puffiness (swelling) in your ankles.  · You have trouble with your vision.  Get help right away if:  · You get a very bad headache and are confused.  · You feel weak, numb, or faint.  · You get chest or belly (abdominal) pain.  · You throw up (vomit).  · You cannot breathe very well.  This information is not intended to replace advice given to you by your health care provider. Make sure you discuss any questions you have with your health care provider.  Document Released: 06/05/2009 Document Revised: 05/25/2017 Document Reviewed: 10/10/2014  [employer] Patient Education © 2017 [employer].

## 2018-09-06 NOTE — PROGRESS NOTES
[facility] Medicine Daily Progress Note    Date of Service  9/5/2018    Chief Complaint  58 y.o. male admitted 9/1/2018 with chest pain    Interval Problem Update  Patient continues to deny any further symptoms of chest discomfort, overall reports he feels well.  Blood sugars remained elevated in the 200-300 range today, basal bolus regimen further intensified.    Consultants/Specialty  CARDS    Disposition  Anticipate discharge likely tomorrow with better control of hyperglycemia    Review of Systems  Review of Systems   Constitutional: Negative for chills and fever.   HENT: Negative for hearing loss.    Eyes: Negative for blurred vision.   Respiratory: Negative for shortness of breath.    Cardiovascular: Negative for chest pain, palpitations and leg swelling.   Gastrointestinal: Negative for abdominal pain, nausea and vomiting.   Genitourinary: Negative for dysuria and urgency.   Musculoskeletal: Negative for myalgias and neck pain.   Neurological: Negative for dizziness, focal weakness and loss of consciousness.   Endo/Heme/Allergies: Does not bruise/bleed easily.   Psychiatric/Behavioral: Negative for depression.   All other systems reviewed and are negative.       Physical Exam  Blood Pressure: 106/67   Temperature: 36.8 °C (98.2 °F)   Pulse: 60   Respiration: 14   Pulse Oximetry: 93 %     Physical Exam   Constitutional: He is oriented to person, place, and time. He appears well-developed and well-nourished. No distress.   HENT:   Head: Normocephalic and atraumatic.   Mouth/Throat: No oropharyngeal exudate.   Eyes: Pupils are equal, round, and reactive to light. Right eye exhibits no discharge. Left eye exhibits no discharge.   Neck: Normal range of motion. Neck supple.   Cardiovascular: Normal rate, regular rhythm, normal heart sounds and intact distal pulses.    No murmur heard.  Pulmonary/Chest: Effort normal and breath sounds normal. No stridor. No respiratory distress. He has no wheezes. He has no rales.    Abdominal: Soft. Bowel sounds are normal. There is no tenderness.   Musculoskeletal: Normal range of motion. He exhibits no edema or tenderness.   Neurological: He is alert and oriented to person, place, and time. No cranial nerve deficit.   Skin: Skin is warm and dry. No rash noted.   Psychiatric: He has a normal mood and affect.   Nursing note and vitals reviewed.      Fluids    Intake/Output Summary (Last 24 hours) at 09/05/18 1700  Last data filed at 09/05/18 1344   Gross per 24 hour   Intake              840 ml   Output              700 ml   Net              140 ml       Laboratory  Recent Labs      09/03/18   0056   WBC  6.3   RBC  4.57*   HEMOGLOBIN  14.0   HEMATOCRIT  41.4*   MCV  90.6   MCH  30.6   MCHC  33.8   RDW  39.7   PLATELETCT  142*   MPV  10.3     Recent Labs      09/03/18   0056   SODIUM  130*   POTASSIUM  4.1   CHLORIDE  103   CO2  21   GLUCOSE  340*   BUN  20   CREATININE  0.92   CALCIUM  9.2                   Imaging  ECHOCARDIOGRAM COMP W/O CONT   Final Result      NM-CARDIAC STRESS TEST   Final Result      CT-CTA CHEST PULMONARY ARTERY W/ RECONS   Final Result      No central or segmental pulmonary embolus is identified.      Dependent airspace opacities likely represent atelectasis. Slightly more confluent patchy opacity in the superior segment of the left lower lobe may be infectious/inflammatory. Follow-up is recommended.      Cardiomegaly.      Healing fractures of the anterior left third, fourth and fifth ribs.      Underlying emphysematous changes.      Small hiatal hernia.      1.2 cm nodule in the left upper quadrant may represent a splenule or an enlarged lymph node.      Duodenal diverticulum is likely present.                  DX-CHEST-PORTABLE (1 VIEW)   Final Result      No acute cardiopulmonary process is seen.      Mild cardiomegaly.              Assessment/Plan  Type 2 diabetes mellitus, with long-term current use of insulin (HCC)- (present on admission)   Assessment & Plan     Patient's stay in the hospital was extended again today primarily due to significant persistent hyperglycemia despite increasing intensity of insulin.  Blood sugars are getting better, now in the 200-300 range.  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    -Increase Humalog to 10 units scheduled q. before meals, with moderate intensity supplemental scale on top  -Continue home Lantus increased to 44 units twice daily  -hemoglobin A1c-10.1, indicating poor control  -Hypoglycemic protocol in place            Chest pain- (present on admission)   Assessment & Plan    -no clear e/o ACS  Initial EKG and troponin negative  -Nuc med cardiac perfusion testing reviewed with cardiology, no evidence of active reversible ischemia.  -continue outpatient ranexa, add nitro PRN, will consider imdur if need for further antianginal  Nitro and morphine when necessary for chest pain    9/5: Patient denies any further chest discomfort today, overall reports he feels well.          Other emphysema (HCC)- (present on admission)   Assessment & Plan    Continue Dulera and albuterol as needed        Essential hypertension- (present on admission)   Assessment & Plan    Continue metoprolol and enalapril        Chronic systolic congestive heart failure (HCC)- (present on admission)   Assessment & Plan    Appears compensated at this time-no clinical changes noted today  Continue metoprolol and enalapril  Fluid and salt restriction        CAD (coronary artery disease)- (present on admission)   Assessment & Plan    Status post CABG and multiple stent placement  Continue aspirin, Plavix and atorvastatin

## 2018-09-06 NOTE — CARE PLAN
Problem: Safety  Goal: Will remain free from falls  Outcome: PROGRESSING AS EXPECTED  Fall precautions in place. Bed in lowest position. Non-skid socks in place. Personal possessions within reach. Mobility sign on door. Bed-alarm on. Call light within reach. Pt educated regarding fall prevention and states understanding.       Problem: Knowledge Deficit  Goal: Knowledge of the prescribed therapeutic regimen will improve  Outcome: PROGRESSING AS EXPECTED  Pt educated regarding plan of care and medications. All questions answered.

## 2018-09-06 NOTE — PROGRESS NOTES
Bedside report received.  Pt denies pain.  POC discussed with pt; all questions answered at this time.

## 2018-09-07 NOTE — PROGRESS NOTES
Pt dc'd to [facility]. IV and monitor removed; monitor room notified. Pt left unit via wheelchair with guards. Personal belongings with pt when leaving unit. Pt given discharge instructions prior to leaving unit including prescription and when to visit with physician; verbalizes understanding. Copy of discharge instructions and transfer paperwork with guards and in the chart.

## 2018-10-09 ENCOUNTER — TELEPHONE (OUTPATIENT)
Dept: CARDIOLOGY | Facility: MEDICAL CENTER | Age: 58
End: 2018-10-09

## 2018-10-09 ENCOUNTER — TELEMEDICINE2 (OUTPATIENT)
Dept: CARDIOLOGY | Facility: MEDICAL CENTER | Age: 58
End: 2018-10-09
Payer: COMMERCIAL

## 2018-10-09 VITALS
DIASTOLIC BLOOD PRESSURE: 81 MMHG | WEIGHT: 194 LBS | OXYGEN SATURATION: 97 % | HEART RATE: 70 BPM | RESPIRATION RATE: 15 BRPM | SYSTOLIC BLOOD PRESSURE: 120 MMHG | TEMPERATURE: 99 F | HEIGHT: 73 IN | BODY MASS INDEX: 25.71 KG/M2

## 2018-10-09 DIAGNOSIS — Z95.1 S/P CABG (CORONARY ARTERY BYPASS GRAFT): ICD-10-CM

## 2018-10-09 DIAGNOSIS — I20.9 ANGINA PECTORIS (HCC): ICD-10-CM

## 2018-10-09 DIAGNOSIS — R06.02 SHORTNESS OF BREATH: ICD-10-CM

## 2018-10-09 DIAGNOSIS — Z95.5 S/P CORONARY ARTERY STENT PLACEMENT: ICD-10-CM

## 2018-10-09 DIAGNOSIS — I10 ESSENTIAL HYPERTENSION: ICD-10-CM

## 2018-10-09 DIAGNOSIS — I25.5 ISCHEMIC CARDIOMYOPATHY: ICD-10-CM

## 2018-10-09 DIAGNOSIS — E78.5 DYSLIPIDEMIA: ICD-10-CM

## 2018-10-09 PROCEDURE — 99214 OFFICE O/P EST MOD 30 MIN: CPT | Performed by: INTERNAL MEDICINE

## 2018-10-09 RX ORDER — NICOTINE POLACRILEX 4 MG
LOZENGE BUCCAL
COMMUNITY

## 2018-10-09 RX ORDER — LEVALBUTEROL TARTRATE 45 UG/1
1-2 AEROSOL, METERED ORAL EVERY 4 HOURS PRN
COMMUNITY

## 2018-10-09 ASSESSMENT — ENCOUNTER SYMPTOMS
PALPITATIONS: 0
COUGH: 0
LOSS OF CONSCIOUSNESS: 0
DIZZINESS: 0
SHORTNESS OF BREATH: 1

## 2018-10-09 NOTE — PROGRESS NOTES
"Chief Complaint   Patient presents with   • Chest Pain     New Telemedicine appt with Cardiology.       Subjective:   Ladarius Cartagena is a 58 y.o. male who presents today for follow-up evaluation of CAD, prior myocardial infarction, coronary stent placement x9, redo CABG 2000 and 2014, ischemic cardiomyopathy left ventricular ejection fraction 40% and AICD biventricular pacemaker.    Last seen at the time of his hospitalization at [facility] on 9/2/2018 for chest pain.  Cardiology consultation by Dr. Tamia Leos.  Myocardial infarction ruled out.  MPI was abnormal.  Echocardiogram showed left ventricular ejection fraction 40%.    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  The patient indicates that his symptoms have been worsening.  The patient is concerned that he may suffer a heart attack.    He reports having undergone a most recent cardiac catheterization in March, 2017 at [facility] in [place], [place] was told that he had a \"50% narrowing\" and was treated medically.    No AICD discharge.  No palpitations.  No PND, orthopnea or lower extremity edema.    Past Medical History:   Diagnosis Date   • Asthma    • Congestive heart failure (HCC)    • Diabetes (HCC)    • Esophageal stricture    • GERD (gastroesophageal reflux disease)    • Hypertension    • MI (myocardial infarction) ([facility])      Past Surgical History:   Procedure Laterality Date   • CARDIAC CATH, RIGHT/LEFT HEART     • PACEMAKER INSERTION     • STENT PLACEMENT      9     History reviewed. No pertinent family history.  Social History     Social History   • Marital status: Single     Spouse name: N/A   • Number of children: N/A   • Years of education: N/A     Occupational History   • Not on file.     Social History Main Topics   • Smoking " "status: Former Smoker   • Smokeless tobacco: Never Used   • Alcohol use No   • Drug use: No   • Sexual activity: Not on file     Other Topics Concern   • Not on file     Social History Narrative   • No narrative on file     Allergies   Allergen Reactions   • Coreg [Carvedilol] Shortness of Breath   • Codeine Rash         • Isordil [Isosorbide] Shortness of Breath   • Ketorolac Unspecified      \"doesn't remember\"     Outpatient Encounter Prescriptions as of 10/9/2018   Medication Sig Dispense Refill   • levalbuterol (XOPENEX HFA) 45 MCG/ACT inhaler Inhale 1-2 Puffs by mouth every four hours as needed for Shortness of Breath.     • Mometasone Furo-Formoterol Fum (DULERA) 100-5 MCG/ACT Aerosol Inhale 2 Puffs by mouth 2 Times a Day.     • glucose 40% (GLUTOSE 15) 40 % Gel      • aspirin EC 81 MG EC tablet Take 1 Tab by mouth every day. 30 Tab 0   • insulin glargine (LANTUS) 100 UNIT/ML Solution Inject 44 Units as instructed every evening. (Patient taking differently: Inject 44 Units as instructed every morning.) 10 mL    • insulin lispro (HUMALOG) 100 UNIT/ML Solution Inject 10 Units as instructed 3 times a day before meals. 10 mL    • atorvastatin (LIPITOR) 40 MG Tab Take 1 Tab by mouth every evening. 30 Tab    • hydroCHLOROthiazide (HYDRODIURIL) 25 MG Tab Take 25 mg by mouth every day.     • raNITidine (ZANTAC) 150 MG Tab Take 150 mg by mouth 2 times a day.     • pregabalin (LYRICA) 75 MG Cap Take 75 mg by mouth 2 times a day.     • pregabalin (LYRICA) 100 MG Cap Take 100 mg by mouth every bedtime.     • metoprolol SR (TOPROL XL) 50 MG TABLET SR 24 HR Take 50 mg by mouth every day.     • ranolazine (RANEXA) 500 MG TABLET SR 12 HR Take 1,000 mg by mouth 2 times a day.     • albuterol 108 (90 Base) MCG/ACT Aero Soln inhalation aerosol Inhale 2 Puffs by mouth every four hours as needed for Shortness of Breath. (Patient not taking: Reported on 10/9/2018) 8.5 g    • budesonide-formoterol (SYMBICORT) 160-4.5 MCG/ACT Aerosol " "Inhale 2 Puffs by mouth 2 Times a Day. (Patient not taking: Reported on 10/9/2018)     • insulin glargine (LANTUS) 100 UNIT/ML Solution Inject 44 Units as instructed every morning. (Patient not taking: Reported on 10/9/2018) 10 mL    • insulin lispro (HUMALOG) 100 UNIT/ML Solution Inject 2-9 Units as instructed 4 Times a Day,Before Meals and at Bedtime. (Patient not taking: Reported on 10/9/2018) 10 mL    • omeprazole (PRILOSEC) 20 MG delayed-release capsule Take 1 Cap by mouth every day. (Patient not taking: Reported on 10/9/2018) 30 Cap    • clopidogrel (PLAVIX) 75 MG Tab Take 1 Tab by mouth every day. 30 Tab    • enalapril (VASOTEC) 5 MG Tab Take 5 mg by mouth every day.       No facility-administered encounter medications on file as of 10/9/2018.      Review of Systems   Respiratory: Positive for shortness of breath. Negative for cough.    Cardiovascular: Positive for chest pain. Negative for palpitations.   Neurological: Negative for dizziness and loss of consciousness.        Objective:   /81 (BP Location: Right arm, Patient Position: Sitting, BP Cuff Size: Adult)   Pulse 70   Temp 37.2 °C (99 °F) (Tympanic)   Resp 15   Ht 1.854 m (6' 1\")   Wt 88 kg (194 lb)   SpO2 97%   BMI 25.60 kg/m²     Physical Exam   Constitutional: He is oriented to person, place, and time. He appears well-developed and well-nourished.   Neck: No JVD present.   Cardiovascular: Normal rate, regular rhythm, normal heart sounds and intact distal pulses.    Pulmonary/Chest: Effort normal and breath sounds normal. No respiratory distress. He has no wheezes. He has no rales.   Musculoskeletal: He exhibits no edema.   Neurological: He is alert and oriented to person, place, and time.   Psychiatric: He has a normal mood and affect. His behavior is normal.     09/02/2018.  MPI   NUCLEAR IMAGING INTERPRETATION   Infarct with tanna-infarct ischemia involving lateral wall.    Non-reversible defects noted.    Moderately reduced left " ventricular systolic function.   Left ventricular ejection fraction 44%.    09/02/2018 ECHOCARDIOGRAM  No prior study is available for comparison.   Moderately reduced left ventricular systolic function.  Left ventricular ejection fraction is visually estimated to be 40%.  Normal right ventricular size and systolic function.  Normal left atrial size.  Trace mitral regurgitation.  Aortic sclerosis without stenosis.  Estimated right ventricular systolic pressure  is 30 mmHg.    Assessment:     1. Angina pectoris (HCC)     2. Shortness of breath     3. Ischemic cardiomyopathy     4. S/P CABG (coronary artery bypass graft)     5. Essential hypertension     6. Dyslipidemia     7. S/P coronary artery stent placement         Medical Decision Making:  Today's Assessment / Status / Plan:     1.  On balance I would recommend a cardiac catheterization however I would first wish to obtain his most recent cardiac catheterization from 4/2017 at [facility] in [place], [place] to review in addition to his 2014 coronary bypass surgery operation report.  I explained to him that due to the complexity of his coronary artery disease, with multiple stents and redo bypass surgery that a repeat coronary intervention may not be feasible which he understood.  2.  In the interim he will continue his current cardiac therapy.  3.  We will make final recommendation for cardiac catheterization after review of the above records.  4.  Subsequent follow-up arranged.

## 2018-10-09 NOTE — TELEPHONE ENCOUNTER
"S/w Dr. Light, per SW \"Yes, pt needs to continue his plavix at this time. Once records are received we will review POC.\"    Attempted to call Miriam @ [facility]. No answer. Left detailed message informing her on Dr. Light's recommendations. Educated her to call back with any questions or concerns, direct RN line provided.     ----- Message from Rylie Jett sent at 10/9/2018  9:54 AM PDT -----  Regarding: Question about patient continuing to take Plavix  MICHELLE/Carlita    Please call Miriam at [facility] at [phone] ext [phone]. Patient had a Telemed appt this morning with Dr Light and Miriam needs to find out if the patient should continue taking Plavix.    "

## 2018-10-12 ENCOUNTER — TELEPHONE (OUTPATIENT)
Dept: CARDIOLOGY | Facility: MEDICAL CENTER | Age: 58
End: 2018-10-12

## 2018-10-12 NOTE — TELEPHONE ENCOUNTER
I sent a request to [facility] in [place], [place] to obtain the CD of heart cath procedure DOS 03/07/2017. The disc will be mail on Monday, 10/15/18 via [facility] to us. I already got the records from the hospital stay as well.     [facility]  CATH LAB Information:  Tel: [phone]  Fax: [phone]      ----- Message from Manish Calderon Ass't sent at 10/11/2018 11:20 AM PDT -----  Regarding: FW: Medical records      ----- Message -----  From: Caleb Light M.D.  Sent: 10/9/2018   9:48 AM  To: [facility] Information Management  Subject: Medical records                                  Please obtain the following medical records  CABG report [place], [place] from 11/2014.    Cardiac catheterization report and CD from [place], [place] March, 2017    Thank

## 2018-10-31 ENCOUNTER — TELEPHONE (OUTPATIENT)
Dept: CARDIOLOGY | Facility: MEDICAL CENTER | Age: 58
End: 2018-10-31

## 2018-10-31 NOTE — TELEPHONE ENCOUNTER
Attempted to contact Cinthya at [facility].  No answer.  LVM advising if patient is having chest pain to seek ER care and call back if further assistance is needed.

## 2018-10-31 NOTE — TELEPHONE ENCOUNTER
Received message from Cinthya at [facility]--she wanted to schedule a pacemaker check appointment--however when  called to schedule stated that patient was having chest wall pain and that is why they wanted device check.  Her number is [phone] ext [phone]

## 2018-11-02 ENCOUNTER — NON-PROVIDER VISIT (OUTPATIENT)
Dept: CARDIOLOGY | Facility: MEDICAL CENTER | Age: 58
End: 2018-11-02
Payer: COMMERCIAL

## 2018-11-02 DIAGNOSIS — I25.5 ISCHEMIC CARDIOMYOPATHY: ICD-10-CM

## 2018-11-02 PROCEDURE — 93283 PRGRMG EVAL IMPLANTABLE DFB: CPT | Performed by: INTERNAL MEDICINE

## 2018-11-09 ENCOUNTER — TELEPHONE (OUTPATIENT)
Dept: CARDIOLOGY | Facility: MEDICAL CENTER | Age: 58
End: 2018-11-09

## 2018-11-09 NOTE — TELEPHONE ENCOUNTER
Call received from Miriam at [facility]. She is wondering what Dr. Light's POC is for pt base on his review of records. Educated her that I will need to follow up with SW to get more information. She states understanding, reassured her that we will follow up next week.     To Dr. Light, please advise on recommendation for POC. Do you want to see pt back via telemed? How soon?Thanks!    Miriam's return #[phone] [phone]

## 2018-12-11 NOTE — TELEPHONE ENCOUNTER
Records still not received from [facility] in [place], [place].  Requested again.      Per last Telemed OV  2.  In the interim he will continue his current cardiac therapy.  3.  We will make final recommendation for cardiac catheterization after review of the above records.  4.  Subsequent follow-up arranged.

## 2018-12-20 NOTE — TELEPHONE ENCOUNTER
Records now available under media.     To SW to review and advise on recommendation for POC - telemed appt? How soon?

## 2019-06-25 NOTE — ED NOTES
Pt received 0.4mg PO x 4 and 325mg PO ASA and 10 mg IV morphine PTA.      [place] [facility] Fall Risk Assessment Tool    Present to ED because of fall  **no*  (Syncope, seizure, or ALOC)  Age>70   no  Altered Mental Status:  (Intoxicated with Alcohol or Substance Confusion,  Inability to follow instructions, disorientation)   no  Impaired Mobility:  Ambulates or transfers with assistive devices or assist  Ambulates with unsteady gait and no assistance  Unable to ambulate or transfer   no  Nursing Judgement  (Bowel or bladder incontinence, diarrhea, urinary   frequency or urgency, leg weakness, orthostatic   hypotension, dizziness or vertigo, narcotic use).   no    Fall Risk Interventions      Familiarize the patient with environment.  Place call light within reach and demonstrate call light use.  Place stretcher in low position and brakes locked     Sent to pt pharmacy. Please advise. Ge Gibbs.  Latrice Curry.  6/25/2019

## 2019-08-13 ENCOUNTER — TELEPHONE (OUTPATIENT)
Dept: CARDIOLOGY | Facility: MEDICAL CENTER | Age: 59
End: 2019-08-13

## 2019-08-13 NOTE — TELEPHONE ENCOUNTER
telemedicine follow up   Received: 5 days ago   Message Contents   Caleb Light M.D.  Lawanda Garsia, R.N.             Arrange for telemedicine follow up        Task to scheduling

## 2020-11-03 ENCOUNTER — TELEMEDICINE2 (OUTPATIENT)
Dept: CARDIOLOGY | Facility: MEDICAL CENTER | Age: 60
End: 2020-11-03
Payer: COMMERCIAL

## 2020-11-03 VITALS
HEART RATE: 62 BPM | HEIGHT: 73 IN | RESPIRATION RATE: 18 BRPM | DIASTOLIC BLOOD PRESSURE: 62 MMHG | OXYGEN SATURATION: 96 % | TEMPERATURE: 97.7 F | SYSTOLIC BLOOD PRESSURE: 111 MMHG | BODY MASS INDEX: 23.06 KG/M2 | WEIGHT: 174 LBS

## 2020-11-03 DIAGNOSIS — I25.119 CORONARY ARTERY DISEASE INVOLVING NATIVE HEART WITH ANGINA PECTORIS, UNSPECIFIED VESSEL OR LESION TYPE (HCC): ICD-10-CM

## 2020-11-03 DIAGNOSIS — I25.5 ISCHEMIC CARDIOMYOPATHY: ICD-10-CM

## 2020-11-03 DIAGNOSIS — Z95.1 S/P CABG (CORONARY ARTERY BYPASS GRAFT): ICD-10-CM

## 2020-11-03 DIAGNOSIS — I10 ESSENTIAL HYPERTENSION: ICD-10-CM

## 2020-11-03 DIAGNOSIS — R07.9 CHEST PAIN, UNSPECIFIED TYPE: ICD-10-CM

## 2020-11-03 DIAGNOSIS — Z95.5 S/P CORONARY ARTERY STENT PLACEMENT: ICD-10-CM

## 2020-11-03 DIAGNOSIS — E78.5 DYSLIPIDEMIA: ICD-10-CM

## 2020-11-03 PROCEDURE — 99214 OFFICE O/P EST MOD 30 MIN: CPT | Mod: GT | Performed by: INTERNAL MEDICINE

## 2020-11-03 RX ORDER — LISINOPRIL 20 MG/1
20 TABLET ORAL DAILY
COMMUNITY

## 2020-11-03 RX ORDER — LIRAGLUTIDE 6 MG/ML
0.6 INJECTION SUBCUTANEOUS DAILY
COMMUNITY

## 2020-11-03 RX ORDER — GABAPENTIN 300 MG/1
300 CAPSULE ORAL 3 TIMES DAILY
COMMUNITY

## 2020-11-03 ASSESSMENT — ENCOUNTER SYMPTOMS
DIZZINESS: 0
COUGH: 0
LOSS OF CONSCIOUSNESS: 0
SHORTNESS OF BREATH: 1
PALPITATIONS: 0

## 2020-11-03 NOTE — PROGRESS NOTES
"Chief Complaint   Patient presents with   • Coronary Artery Disease   • CHF (Systolic)   • Cardiomyopathy (Ischemic)   • Coronary Artery Bypass Graft (CABG)       Subjective:   Ladarius Cartagena is a 60 y.o. male who presents today for follow-up evaluation of CAD, prior myocardial infarction, coronary stent placement x9, redo CABG 2000 and 2014, ischemic cardiomyopathy left ventricular ejection fraction 40% and AICD biventricular pacemaker.    This evaluation was conducted via [employer] using secure and encrypted videoconferencing technology. [facility]. The patient was presented by a medical professional at the originating site.   The patient's identity was confirmed and verbal consent was obtained for this telemedicine encounter.    Since last seen 10/9/2018.    Since 10/9/2018 the patient was transferred from [facility] in [place], [place] to [facility] in [facility].  Patient states that he has been hospitalized there at [facility] in [place] twice in the past 2 months for \"4 shocks\" and \"elevated troponin levels\".  No medical records available.  He states that the ICD device was checked and there were no shocks.  He apparently had a nuclear stress test.  He apparently was discharged.  He has been using nitroglycerin and reports shortness of breath.  He apparently does not get along with a local cardiologist this contracted with the custodial.    Last seen at the time of his hospitalization at [facility] on 9/2/2018 for chest pain.  Cardiology consultation by Dr. Tamia Leos.  Myocardial infarction ruled out.  MPI was abnormal.  Echocardiogram showed left ventricular ejection fraction 40%.    Since discharge the patient reports exertional substernal left anterior diffuse chest discomfort with radiation to his back and shortness of breath when he walks in " "the longterm yard, responsive each time to 2 nitroglycerin tablets.  The patient indicates that his symptoms have been worsening.  The patient is concerned that he may suffer a heart attack.    He reports having undergone a most recent cardiac catheterization in March, 2017 at [facility] in [place], [place] was told that he had a \"50% narrowing\" and was treated medically.    No AICD discharge.  No palpitations.  No PND, orthopnea or lower extremity edema.    Past Medical History:   Diagnosis Date   • Asthma    • Congestive heart failure (HCC)    • Diabetes ([facility])    • Esophageal stricture    • GERD (gastroesophageal reflux disease)    • Hypertension    • MI (myocardial infarction) ([facility])      Past Surgical History:   Procedure Laterality Date   • CARDIAC CATH, RIGHT/LEFT HEART     • PACEMAKER INSERTION     • STENT PLACEMENT      9     History reviewed. No pertinent family history.  Social History     Socioeconomic History   • Marital status: Single     Spouse name: Not on file   • Number of children: Not on file   • Years of education: Not on file   • Highest education level: Not on file   Occupational History   • Not on file   Social Needs   • Financial resource strain: Not on file   • Food insecurity     Worry: Not on file     Inability: Not on file   • Transportation needs     Medical: Not on file     Non-medical: Not on file   Tobacco Use   • Smoking status: Former Smoker     Packs/day: 2.50     Years: 20.00     Pack years: 50.00     Types: Cigarettes     Quit date: 11/3/2010     Years since quitting: 10.0   • Smokeless tobacco: Never Used   Substance and Sexual Activity   • Alcohol use: Not Currently   • Drug use: Not Currently     Comment: herroin   • Sexual activity: Not on file   Lifestyle   • Physical activity     Days per week: Not on file     Minutes per session: Not on file   • Stress: Not on file   Relationships   • Social connections     Talks on phone: Not on file     Gets together: Not " "on file     Attends Hindu service: Not on file     Active member of club or organization: Not on file     Attends meetings of clubs or organizations: Not on file     Relationship status: Not on file   • Intimate partner violence     Fear of current or ex partner: Not on file     Emotionally abused: Not on file     Physically abused: Not on file     Forced sexual activity: Not on file   Other Topics Concern   • Not on file   Social History Narrative   • Not on file     Allergies   Allergen Reactions   • Coreg [Carvedilol] Shortness of Breath   • Codeine Rash         • Isordil [Isosorbide] Shortness of Breath   • Ketorolac Unspecified      \"doesn't remember\"     Outpatient Encounter Medications as of 11/3/2020   Medication Sig Dispense Refill   • liraglutide (VICTOZA) 18 MG/3ML Solution Pen-injector Inject 0.6 mg as instructed every day.     • insulin 70/30 (HUMULIN 70/30/NOVOLIN 70/30) (70-30) 100 UNIT/ML Suspension Inject  as instructed.     • gabapentin (NEURONTIN) 300 MG Cap Take 300 mg by mouth 3 times a day.     • lisinopril (PRINIVIL) 20 MG Tab Take 20 mg by mouth every day.     • AMLODIPINE BESYLATE PO Take 5 mg by mouth.     • Buprenorphine HCl-Naloxone HCl (SUBOXONE SL) Place 5 mg under tongue.     • levalbuterol (XOPENEX HFA) 45 MCG/ACT inhaler Inhale 1-2 Puffs by mouth every four hours as needed for Shortness of Breath.     • Mometasone Furo-Formoterol Fum (DULERA) 100-5 MCG/ACT Aerosol Inhale 2 Puffs by mouth 2 Times a Day.     • glucose 40% (GLUTOSE 15) 40 % Gel      • aspirin EC 81 MG EC tablet Take 1 Tab by mouth every day. 30 Tab 0   • albuterol 108 (90 Base) MCG/ACT Aero Soln inhalation aerosol Inhale 2 Puffs by mouth every four hours as needed for Shortness of Breath. 8.5 g    • budesonide-formoterol (SYMBICORT) 160-4.5 MCG/ACT Aerosol Inhale 2 Puffs by mouth 2 Times a Day.     • insulin glargine (LANTUS) 100 UNIT/ML Solution Inject 44 Units as instructed every evening. (Patient taking " "differently: Inject 44 Units as instructed every morning.) 10 mL    • atorvastatin (LIPITOR) 40 MG Tab Take 1 Tab by mouth every evening. 30 Tab    • omeprazole (PRILOSEC) 20 MG delayed-release capsule Take 1 Cap by mouth every day. 30 Cap    • clopidogrel (PLAVIX) 75 MG Tab Take 1 Tab by mouth every day. 30 Tab    • hydroCHLOROthiazide (HYDRODIURIL) 25 MG Tab Take 25 mg by mouth every day.     • metoprolol SR (TOPROL XL) 50 MG TABLET SR 24 HR Take 75 mg by mouth every day.     • ranolazine (RANEXA) 500 MG TABLET SR 12 HR Take 1,000 mg by mouth 2 times a day.     • insulin glargine (LANTUS) 100 UNIT/ML Solution Inject 44 Units as instructed every morning. (Patient not taking: Reported on 10/9/2018) 10 mL    • insulin lispro (HUMALOG) 100 UNIT/ML Solution Inject 10 Units as instructed 3 times a day before meals. 10 mL    • insulin lispro (HUMALOG) 100 UNIT/ML Solution Inject 2-9 Units as instructed 4 Times a Day,Before Meals and at Bedtime. (Patient not taking: Reported on 10/9/2018) 10 mL    • raNITidine (ZANTAC) 150 MG Tab Take 150 mg by mouth 2 times a day.     • enalapril (VASOTEC) 5 MG Tab Take 5 mg by mouth every day.     • pregabalin (LYRICA) 75 MG Cap Take 75 mg by mouth 2 times a day.     • pregabalin (LYRICA) 100 MG Cap Take 100 mg by mouth every bedtime.       No facility-administered encounter medications on file as of 11/3/2020.      Review of Systems   Respiratory: Positive for shortness of breath. Negative for cough.    Cardiovascular: Positive for chest pain. Negative for palpitations.   Neurological: Negative for dizziness and loss of consciousness.        Objective:   /62 (BP Location: Right arm, Patient Position: Sitting, BP Cuff Size: Adult)   Pulse 62   Temp 36.5 °C (97.7 °F) (Temporal)   Resp 18   Ht 1.854 m (6' 1\")   Wt 78.9 kg (174 lb)   SpO2 96%   BMI 22.96 kg/m²     Physical Exam   Constitutional: He is oriented to person, place, and time. No distress.   Cardiovascular: Normal " rate, regular rhythm and normal heart sounds.   Pulmonary/Chest: Effort normal and breath sounds normal. He has no wheezes. He has no rales.   Musculoskeletal:         General: No edema.   Neurological: He is alert and oriented to person, place, and time.   Psychiatric: He has a normal mood and affect. His behavior is normal.     09/02/2018.  MPI   NUCLEAR IMAGING INTERPRETATION   Infarct with tanna-infarct ischemia involving lateral wall.    Non-reversible defects noted.    Moderately reduced left ventricular systolic function.   Left ventricular ejection fraction 44%.    09/02/2018 ECHOCARDIOGRAM  No prior study is available for comparison.   Moderately reduced left ventricular systolic function.  Left ventricular ejection fraction is visually estimated to be 40%.  Normal right ventricular size and systolic function.  Normal left atrial size.  Trace mitral regurgitation.  Aortic sclerosis without stenosis.  Estimated right ventricular systolic pressure  is 30 mmHg.    Assessment:     1. Coronary artery disease involving native heart with angina pectoris, unspecified vessel or lesion type ([facility])     2. S/P CABG (coronary artery bypass graft)     3. S/P coronary artery stent placement     4. Essential hypertension     5. Dyslipidemia     6. Ischemic cardiomyopathy     7. Chest pain, unspecified type         Medical Decision Making:  Today's Assessment / Status / Plan:     Recommendation Discussion  1.  The patient is located in [facility].  2.  I explained to him that he will need to have his problems of ongoing chest pain and shortness of breath addressed by the local cardiologists in that area and that should work with the administrators to assist him.